# Patient Record
Sex: FEMALE | Race: WHITE | NOT HISPANIC OR LATINO | Employment: UNEMPLOYED | URBAN - NONMETROPOLITAN AREA
[De-identification: names, ages, dates, MRNs, and addresses within clinical notes are randomized per-mention and may not be internally consistent; named-entity substitution may affect disease eponyms.]

---

## 2017-05-15 ENCOUNTER — TRANSCRIBE ORDERS (OUTPATIENT)
Dept: ADMINISTRATIVE | Facility: HOSPITAL | Age: 72
End: 2017-05-15

## 2017-05-15 DIAGNOSIS — R10.9 ABDOMINAL PAIN, UNSPECIFIED LOCATION: Primary | ICD-10-CM

## 2017-05-17 ENCOUNTER — HOSPITAL ENCOUNTER (OUTPATIENT)
Dept: CT IMAGING | Facility: HOSPITAL | Age: 72
Discharge: HOME | End: 2017-05-17
Admitting: NURSE PRACTITIONER

## 2017-05-17 DIAGNOSIS — R10.9 ABDOMINAL PAIN, UNSPECIFIED LOCATION: ICD-10-CM

## 2017-05-17 LAB — CREAT BLDA-MCNC: 0.8 MG/DL (ref 0.6–1.3)

## 2017-05-17 PROCEDURE — 82565 ASSAY OF CREATININE: CPT

## 2017-05-17 PROCEDURE — 74177 CT ABD & PELVIS W/CONTRAST: CPT

## 2017-05-17 PROCEDURE — 0 IOPAMIDOL 61 % SOLUTION: Performed by: NURSE PRACTITIONER

## 2017-05-17 PROCEDURE — 74177 CT ABD & PELVIS W/CONTRAST: CPT | Performed by: RADIOLOGY

## 2017-05-17 RX ADMIN — IOPAMIDOL 100 ML: 612 INJECTION, SOLUTION INTRAVENOUS at 11:00

## 2017-05-24 ENCOUNTER — OFFICE VISIT (OUTPATIENT)
Dept: SURGERY | Facility: CLINIC | Age: 72
End: 2017-05-24

## 2017-05-24 VITALS
BODY MASS INDEX: 26.13 KG/M2 | HEIGHT: 62 IN | SYSTOLIC BLOOD PRESSURE: 130 MMHG | DIASTOLIC BLOOD PRESSURE: 71 MMHG | HEART RATE: 74 BPM | WEIGHT: 142 LBS

## 2017-05-24 DIAGNOSIS — K80.20 GALLSTONES: Primary | ICD-10-CM

## 2017-05-24 PROCEDURE — 99204 OFFICE O/P NEW MOD 45 MIN: CPT | Performed by: SURGERY

## 2017-05-24 RX ORDER — FENOFIBRATE 160 MG/1
160 TABLET ORAL DAILY
COMMUNITY
Start: 2017-04-18

## 2017-05-24 RX ORDER — PRAVASTATIN SODIUM 40 MG
40 TABLET ORAL DAILY
COMMUNITY
Start: 2017-04-18

## 2017-05-24 RX ORDER — SODIUM CHLORIDE 0.9 % (FLUSH) 0.9 %
1-10 SYRINGE (ML) INJECTION AS NEEDED
Status: CANCELLED | OUTPATIENT
Start: 2017-05-24

## 2017-05-24 RX ORDER — METOPROLOL TARTRATE 50 MG/1
50 TABLET, FILM COATED ORAL 2 TIMES DAILY
Status: ON HOLD | COMMUNITY
Start: 2017-04-18 | End: 2020-08-20

## 2017-05-24 RX ORDER — PANTOPRAZOLE SODIUM 40 MG/1
40 TABLET, DELAYED RELEASE ORAL DAILY
COMMUNITY
Start: 2017-02-20

## 2017-05-24 RX ORDER — FLUCONAZOLE 200 MG/1
TABLET ORAL
COMMUNITY
Start: 2017-04-18 | End: 2017-09-14

## 2017-08-28 ENCOUNTER — TRANSCRIBE ORDERS (OUTPATIENT)
Dept: ADMINISTRATIVE | Facility: HOSPITAL | Age: 72
End: 2017-08-28

## 2017-08-28 DIAGNOSIS — R10.9 ABDOMINAL PAIN, ACUTE: Primary | ICD-10-CM

## 2017-08-28 DIAGNOSIS — K80.20 GALLSTONES: ICD-10-CM

## 2017-08-31 ENCOUNTER — HOSPITAL ENCOUNTER (OUTPATIENT)
Dept: ULTRASOUND IMAGING | Facility: HOSPITAL | Age: 72
Discharge: HOME | End: 2017-08-31
Attending: INTERNAL MEDICINE

## 2017-08-31 ENCOUNTER — HOSPITAL ENCOUNTER (OUTPATIENT)
Dept: NUCLEAR MEDICINE | Facility: HOSPITAL | Age: 72
Discharge: HOME | End: 2017-08-31
Attending: INTERNAL MEDICINE

## 2017-08-31 DIAGNOSIS — K80.20 GALLSTONES: ICD-10-CM

## 2017-08-31 DIAGNOSIS — R10.9 ABDOMINAL PAIN, ACUTE: ICD-10-CM

## 2017-08-31 PROCEDURE — 0 TECHNETIUM TC 99M MEBROFENIN KIT: Performed by: INTERNAL MEDICINE

## 2017-08-31 PROCEDURE — 78226 HEPATOBILIARY SYSTEM IMAGING: CPT | Performed by: RADIOLOGY

## 2017-08-31 PROCEDURE — 93975 VASCULAR STUDY: CPT

## 2017-08-31 PROCEDURE — A9537 TC99M MEBROFENIN: HCPCS | Performed by: INTERNAL MEDICINE

## 2017-08-31 PROCEDURE — 78226 HEPATOBILIARY SYSTEM IMAGING: CPT

## 2017-08-31 PROCEDURE — 93975 VASCULAR STUDY: CPT | Performed by: RADIOLOGY

## 2017-08-31 RX ORDER — KIT FOR THE PREPARATION OF TECHNETIUM TC 99M MEBROFENIN 45 MG/10ML
1 INJECTION, POWDER, LYOPHILIZED, FOR SOLUTION INTRAVENOUS
Status: COMPLETED | OUTPATIENT
Start: 2017-08-31 | End: 2017-08-31

## 2017-08-31 RX ADMIN — MEBROFENIN 1 DOSE: 45 INJECTION, POWDER, LYOPHILIZED, FOR SOLUTION INTRAVENOUS at 08:49

## 2017-09-13 ENCOUNTER — OFFICE VISIT (OUTPATIENT)
Dept: SURGERY | Facility: CLINIC | Age: 72
End: 2017-09-13

## 2017-09-13 VITALS
DIASTOLIC BLOOD PRESSURE: 79 MMHG | SYSTOLIC BLOOD PRESSURE: 117 MMHG | HEART RATE: 86 BPM | BODY MASS INDEX: 26.13 KG/M2 | WEIGHT: 142 LBS | HEIGHT: 62 IN

## 2017-09-13 DIAGNOSIS — K82.8 BILIARY DYSKINESIA: Primary | ICD-10-CM

## 2017-09-13 PROCEDURE — 99214 OFFICE O/P EST MOD 30 MIN: CPT | Performed by: SURGERY

## 2017-09-13 RX ORDER — SODIUM CHLORIDE 0.9 % (FLUSH) 0.9 %
1-10 SYRINGE (ML) INJECTION AS NEEDED
Status: CANCELLED | OUTPATIENT
Start: 2017-09-13

## 2017-09-13 NOTE — PROGRESS NOTES
Subjective   Nancy Healy is a 72 y.o. female is being seen for consultation today at the request of Eliel Barcenas MD    Abdominal Pain   This is a new problem. The current episode started more than 1 month ago. The onset quality is sudden. The problem occurs intermittently. The problem has been gradually worsening. The pain is located in the RUQ. The pain is at a severity of 6/10. The pain is moderate. The quality of the pain is dull and a sensation of fullness. The abdominal pain radiates to the back. Associated symptoms include a fever. Pertinent negatives include no anorexia, constipation, diarrhea, dysuria, headaches, nausea or vomiting. The pain is aggravated by eating. The pain is relieved by nothing. She has tried acetaminophen for the symptoms. The treatment provided mild relief. Prior diagnostic workup includes lower endoscopy (HIDA EF 30%). Her past medical history is significant for abdominal surgery (hysterectomy).       Past Medical History:   Diagnosis Date   • Basal cell carcinoma        Family History   Problem Relation Age of Onset   • Heart disease Mother    • Diabetes Mother    • Heart disease Father    • Cancer Sister    • Heart disease Sister    • Cancer Brother    • Cancer Brother    • Heart disease Brother    • Cancer Brother    • Heart disease Brother    • Diabetes Brother    • Cancer Sister    • Heart disease Sister    • Diabetes Sister    • Heart disease Sister    • Cancer Sister        Social History     Social History   • Marital status:      Spouse name: N/A   • Number of children: N/A   • Years of education: N/A     Occupational History   • Not on file.     Social History Main Topics   • Smoking status: Never Smoker   • Smokeless tobacco: Not on file   • Alcohol use No   • Drug use: No   • Sexual activity: Defer     Other Topics Concern   • Not on file     Social History Narrative       Past Surgical History:   Procedure Laterality Date   • CARDIAC CATHETERIZATION     •  "HYSTERECTOMY     • SKIN LESION EXCISION         The following portions of the patient's history were reviewed and updated as appropriate: allergies, current medications, past family history, past medical history, past social history, past surgical history and problem list.    Review of Systems   Constitutional: Positive for fever. Negative for activity change, appetite change and chills.   HENT: Negative for sore throat and trouble swallowing.    Eyes: Negative for visual disturbance.   Respiratory: Negative for cough and shortness of breath.    Cardiovascular: Negative for chest pain and palpitations.   Gastrointestinal: Positive for abdominal pain. Negative for abdominal distention, anorexia, blood in stool, constipation, diarrhea, nausea and vomiting.   Endocrine: Negative for cold intolerance and heat intolerance.   Genitourinary: Negative for dysuria.   Musculoskeletal: Negative for joint swelling.   Skin: Negative for color change, rash and wound.   Allergic/Immunologic: Negative for immunocompromised state.   Neurological: Negative for dizziness, seizures, weakness and headaches.   Hematological: Negative for adenopathy. Does not bruise/bleed easily.   Psychiatric/Behavioral: Negative for agitation and confusion.         /79  Pulse 86  Ht 62\" (157.5 cm)  Wt 142 lb (64.4 kg)  BMI 25.97 kg/m2  Objective   Physical Exam   Constitutional: She is oriented to person, place, and time. She appears well-developed.   HENT:   Head: Normocephalic and atraumatic.   Mouth/Throat: Mucous membranes are normal.   Eyes: Conjunctivae are normal. Pupils are equal, round, and reactive to light.   Neck: Neck supple. No JVD present. No tracheal deviation present. No thyromegaly present.   Cardiovascular: Normal rate and regular rhythm.  Exam reveals no gallop and no friction rub.    No murmur heard.  Pulmonary/Chest: Effort normal and breath sounds normal.   Abdominal: Soft. She exhibits no distension. There is no " splenomegaly or hepatomegaly. There is no tenderness. No hernia.   Musculoskeletal: Normal range of motion. She exhibits no deformity.   Neurological: She is alert and oriented to person, place, and time.   Skin: Skin is warm and dry.   Psychiatric: She has a normal mood and affect.         Nancy was seen today for gallbladder dysfunction.    Diagnoses and all orders for this visit:    Biliary dyskinesia  -     Case Request; Standing  -     CBC and Differential; Future  -     Comprehensive metabolic panel; Future  -     sodium chloride 0.9 % flush 1-10 mL; Infuse 1-10 mL into a venous catheter As Needed for Line Care.  -     metroNIDAZOLE (FLAGYL) IVPB 500 mg; Infuse 100 mL into a venous catheter 1 (One) Time.  -     Case Request    Other orders  -     Provide instructions to patient on NPO status  -     Clorhexidine skin prep  -     Follow Anesthesia Guidelines / Standing Orders; Standing  -     Verify NPO Status; Standing  -     Obtain informed consent; Standing  -     SCD (sequential compression device)- to be placed on patient in Pre-op; Standing  -     Instructions on coughing, deep breathing, and incentive spirometry.; Standing  -     Insert Peripheral IV; Standing  -     Saline Lock & Maintain IV Access; Standing        Assessment     71 yo F with biliary dyskinesia.  She understands the risks and benefits of surgery and will be scheduled for laparoscopic cholecystectomy.

## 2017-09-14 ENCOUNTER — APPOINTMENT (OUTPATIENT)
Dept: PREADMISSION TESTING | Facility: HOSPITAL | Age: 72
End: 2017-09-14

## 2017-09-14 DIAGNOSIS — K82.8 BILIARY DYSKINESIA: ICD-10-CM

## 2017-09-14 LAB
ALBUMIN SERPL-MCNC: 4.4 G/DL (ref 3.4–4.8)
ALBUMIN/GLOB SERPL: 1.6 G/DL (ref 1.5–2.5)
ALP SERPL-CCNC: 79 U/L (ref 35–104)
ALT SERPL W P-5'-P-CCNC: 19 U/L (ref 10–36)
ANION GAP SERPL CALCULATED.3IONS-SCNC: 6.5 MMOL/L (ref 3.6–11.2)
AST SERPL-CCNC: 23 U/L (ref 10–30)
BASOPHILS # BLD AUTO: 0.02 10*3/MM3 (ref 0–0.3)
BASOPHILS NFR BLD AUTO: 0.2 % (ref 0–2)
BILIRUB SERPL-MCNC: 0.3 MG/DL (ref 0.2–1.8)
BUN BLD-MCNC: 16 MG/DL (ref 7–21)
BUN/CREAT SERPL: 19.5 (ref 7–25)
CALCIUM SPEC-SCNC: 9.6 MG/DL (ref 7.7–10)
CHLORIDE SERPL-SCNC: 108 MMOL/L (ref 99–112)
CO2 SERPL-SCNC: 28.5 MMOL/L (ref 24.3–31.9)
CREAT BLD-MCNC: 0.82 MG/DL (ref 0.43–1.29)
DEPRECATED RDW RBC AUTO: 45.2 FL (ref 37–54)
EOSINOPHIL # BLD AUTO: 0.09 10*3/MM3 (ref 0–0.7)
EOSINOPHIL NFR BLD AUTO: 1 % (ref 0–7)
ERYTHROCYTE [DISTWIDTH] IN BLOOD BY AUTOMATED COUNT: 13.4 % (ref 11.5–14.5)
GFR SERPL CREATININE-BSD FRML MDRD: 69 ML/MIN/1.73
GLOBULIN UR ELPH-MCNC: 2.7 GM/DL
GLUCOSE BLD-MCNC: 99 MG/DL (ref 70–110)
HCT VFR BLD AUTO: 46.2 % (ref 37–47)
HGB BLD-MCNC: 15.1 G/DL (ref 12–16)
IMM GRANULOCYTES # BLD: 0.03 10*3/MM3 (ref 0–0.03)
IMM GRANULOCYTES NFR BLD: 0.3 % (ref 0–0.5)
LYMPHOCYTES # BLD AUTO: 2.63 10*3/MM3 (ref 1–3)
LYMPHOCYTES NFR BLD AUTO: 29.7 % (ref 16–46)
MCH RBC QN AUTO: 30.2 PG (ref 27–33)
MCHC RBC AUTO-ENTMCNC: 32.7 G/DL (ref 33–37)
MCV RBC AUTO: 92.4 FL (ref 80–94)
MONOCYTES # BLD AUTO: 0.7 10*3/MM3 (ref 0.1–0.9)
MONOCYTES NFR BLD AUTO: 7.9 % (ref 0–12)
NEUTROPHILS # BLD AUTO: 5.38 10*3/MM3 (ref 1.4–6.5)
NEUTROPHILS NFR BLD AUTO: 60.9 % (ref 40–75)
OSMOLALITY SERPL CALC.SUM OF ELEC: 286.2 MOSM/KG (ref 273–305)
PLATELET # BLD AUTO: 272 10*3/MM3 (ref 130–400)
PMV BLD AUTO: 10.9 FL (ref 6–10)
POTASSIUM BLD-SCNC: 3.8 MMOL/L (ref 3.5–5.3)
PROT SERPL-MCNC: 7.1 G/DL (ref 6–8)
RBC # BLD AUTO: 5 10*6/MM3 (ref 4.2–5.4)
SODIUM BLD-SCNC: 143 MMOL/L (ref 135–153)
WBC NRBC COR # BLD: 8.85 10*3/MM3 (ref 4.5–12.5)

## 2017-09-14 PROCEDURE — 85025 COMPLETE CBC W/AUTO DIFF WBC: CPT | Performed by: SURGERY

## 2017-09-14 PROCEDURE — 80053 COMPREHEN METABOLIC PANEL: CPT | Performed by: SURGERY

## 2017-09-14 PROCEDURE — 36415 COLL VENOUS BLD VENIPUNCTURE: CPT

## 2017-09-14 RX ORDER — ALENDRONATE SODIUM 70 MG/1
70 TABLET ORAL
COMMUNITY

## 2017-09-14 NOTE — DISCHARGE INSTRUCTIONS
TAKE the following medications the morning of surgery:  All heart or blood pressure medications    Please discontinue all blood thinners and anticoagulants (except aspirin) prior to surgery as per your surgeon and cardiologist instructions.  Aspirin may be continued up to the day prior to surgery.    HOLD all diabetic medications the morning of surgery as order by physician.    Please follow instructions on use of prep cloths provided by nurse. Return instruction sheet with stickers attached to pre-op nurse on day of surgery.    Arrival time for surgery on 9/15/2017 is 0830 am.    General Instructions:  • Do NOT eat or drink after midnight 9/14/2017 which includes water, mints, or gum.  • You may brush your teeth. Dental appliances that are removable must be taken out day of surgery.  • Do NOT smoke, chew tobacco, or drink alcohol within 24 hours prior to surgery.  • Bring medications in original bottles, any inhalers and if applicable your C-PAP/BI-PAP machine  • Bring any papers given to you in the doctor’s office  • Wear clean, comfortable clothes and socks  • Do NOT wear contact lenses or make-up or dark nail polish.  Bring a case for your glasses if applicable.  • Bring crutches or walker if applicable  • Leave all other valuables and jewelry at home  • If you were given a blood bank armband, continue to wear it until discharged.    Preventing a Surgical Site Infection:  • Shower on the morning of surgery using a fresh bar of anti-bacterial soap (such as Dial) and clean washcloth.  Dry with a clean towel and dress in clean clothing.  • For 2 to 3 days before surgery, avoid shaving with a razor near where you will have surgery because the razor can irritate skin and make it easier to develop an infection.  Ask your surgeon if you will be receiving antibiotics prior to surgery.  • Make sure you, your family, and all healthcare providers clean their hands with soap and water or an alcohol-based hand   before caring for you or your wound.  • If at all possible, quit smoking as many days before surgery as you can.    Day of Surgery:  Upon arrival, a pre-op nurse and anesthesiologist will review your health history, obtain vital signs, and answer questions you may have.  The only belongings needed at this time will be your home medications and if applicable you C-PAP/BI-PAP machine.  If you are staying overnight, your family can leave the rest of your belongings in the car and bring them to your room later.  A pre-op nurse will start an IV and you may receive medication in preparation for surgery.  Due to patient privacy and limited space, only one member of your family will be able to accompany you in the pre-op area.  While you are in surgery your family should notify the waiting room  if they leave the waiting room area and provide a contact number.  Please be aware that surgery does come with discomfort.  We want to make every effort to control your discomfort so please discuss any uncontrolled symptoms with your nurse.  Your doctor will most likely have prescribed pain medications.  If you are going home after surgery you will receive individualized written care instructions before being discharged.  A responsible adult must drive you to and from the hospital on the day of surgery and stay with you for 24 hours.  If you are staying overnight following surgery, you will be transported to your hospital room following the recovery period.

## 2017-09-15 ENCOUNTER — HOSPITAL ENCOUNTER (OUTPATIENT)
Facility: HOSPITAL | Age: 72
Setting detail: HOSPITAL OUTPATIENT SURGERY
Discharge: HOME | End: 2017-09-15
Attending: SURGERY | Admitting: SURGERY

## 2017-09-15 ENCOUNTER — ANESTHESIA EVENT (OUTPATIENT)
Dept: PERIOP | Facility: HOSPITAL | Age: 72
End: 2017-09-15

## 2017-09-15 ENCOUNTER — ANESTHESIA (OUTPATIENT)
Dept: PERIOP | Facility: HOSPITAL | Age: 72
End: 2017-09-15

## 2017-09-15 VITALS
TEMPERATURE: 97.6 F | OXYGEN SATURATION: 96 % | HEIGHT: 62 IN | RESPIRATION RATE: 18 BRPM | SYSTOLIC BLOOD PRESSURE: 150 MMHG | WEIGHT: 143 LBS | BODY MASS INDEX: 26.31 KG/M2 | HEART RATE: 51 BPM | DIASTOLIC BLOOD PRESSURE: 70 MMHG

## 2017-09-15 DIAGNOSIS — K82.8 BILIARY DYSKINESIA: ICD-10-CM

## 2017-09-15 PROCEDURE — 47562 LAPAROSCOPIC CHOLECYSTECTOMY: CPT | Performed by: SURGERY

## 2017-09-15 PROCEDURE — 25010000002 FENTANYL CITRATE (PF) 100 MCG/2ML SOLUTION: Performed by: NURSE ANESTHETIST, CERTIFIED REGISTERED

## 2017-09-15 PROCEDURE — 25010000002 PROPOFOL 10 MG/ML EMULSION: Performed by: NURSE ANESTHETIST, CERTIFIED REGISTERED

## 2017-09-15 PROCEDURE — 25010000002 ONDANSETRON PER 1 MG: Performed by: NURSE ANESTHETIST, CERTIFIED REGISTERED

## 2017-09-15 PROCEDURE — 25010000002 NEOSTIGMINE 10 MG/10ML SOLUTION: Performed by: NURSE ANESTHETIST, CERTIFIED REGISTERED

## 2017-09-15 PROCEDURE — 25010000002 DEXAMETHASONE PER 1 MG: Performed by: NURSE ANESTHETIST, CERTIFIED REGISTERED

## 2017-09-15 PROCEDURE — 88304 TISSUE EXAM BY PATHOLOGIST: CPT | Performed by: SURGERY

## 2017-09-15 PROCEDURE — 25010000002 MIDAZOLAM PER 1 MG: Performed by: NURSE ANESTHETIST, CERTIFIED REGISTERED

## 2017-09-15 PROCEDURE — 94799 UNLISTED PULMONARY SVC/PX: CPT

## 2017-09-15 RX ORDER — ROCURONIUM BROMIDE 10 MG/ML
INJECTION, SOLUTION INTRAVENOUS AS NEEDED
Status: DISCONTINUED | OUTPATIENT
Start: 2017-09-15 | End: 2017-09-15 | Stop reason: SURG

## 2017-09-15 RX ORDER — IPRATROPIUM BROMIDE AND ALBUTEROL SULFATE 2.5; .5 MG/3ML; MG/3ML
3 SOLUTION RESPIRATORY (INHALATION) ONCE AS NEEDED
Status: DISCONTINUED | OUTPATIENT
Start: 2017-09-15 | End: 2017-09-15 | Stop reason: HOSPADM

## 2017-09-15 RX ORDER — FENTANYL CITRATE 50 UG/ML
INJECTION, SOLUTION INTRAMUSCULAR; INTRAVENOUS AS NEEDED
Status: DISCONTINUED | OUTPATIENT
Start: 2017-09-15 | End: 2017-09-15 | Stop reason: SURG

## 2017-09-15 RX ORDER — SODIUM CHLORIDE 0.9 % (FLUSH) 0.9 %
1-10 SYRINGE (ML) INJECTION AS NEEDED
Status: DISCONTINUED | OUTPATIENT
Start: 2017-09-15 | End: 2017-09-15 | Stop reason: HOSPADM

## 2017-09-15 RX ORDER — LIDOCAINE HYDROCHLORIDE 20 MG/ML
INJECTION, SOLUTION INFILTRATION; PERINEURAL AS NEEDED
Status: DISCONTINUED | OUTPATIENT
Start: 2017-09-15 | End: 2017-09-15 | Stop reason: SURG

## 2017-09-15 RX ORDER — LABETALOL HYDROCHLORIDE 5 MG/ML
INJECTION, SOLUTION INTRAVENOUS AS NEEDED
Status: DISCONTINUED | OUTPATIENT
Start: 2017-09-15 | End: 2017-09-15 | Stop reason: SURG

## 2017-09-15 RX ORDER — BUPIVACAINE HYDROCHLORIDE AND EPINEPHRINE 2.5; 5 MG/ML; UG/ML
INJECTION, SOLUTION EPIDURAL; INFILTRATION; INTRACAUDAL; PERINEURAL AS NEEDED
Status: DISCONTINUED | OUTPATIENT
Start: 2017-09-15 | End: 2017-09-15 | Stop reason: HOSPADM

## 2017-09-15 RX ORDER — SODIUM CHLORIDE, SODIUM LACTATE, POTASSIUM CHLORIDE, CALCIUM CHLORIDE 600; 310; 30; 20 MG/100ML; MG/100ML; MG/100ML; MG/100ML
125 INJECTION, SOLUTION INTRAVENOUS CONTINUOUS
Status: DISCONTINUED | OUTPATIENT
Start: 2017-09-15 | End: 2017-09-15 | Stop reason: HOSPADM

## 2017-09-15 RX ORDER — ESMOLOL HYDROCHLORIDE 10 MG/ML
INJECTION INTRAVENOUS AS NEEDED
Status: DISCONTINUED | OUTPATIENT
Start: 2017-09-15 | End: 2017-09-15 | Stop reason: SURG

## 2017-09-15 RX ORDER — HYDROCODONE BITARTRATE AND ACETAMINOPHEN 5; 325 MG/1; MG/1
1-2 TABLET ORAL EVERY 4 HOURS PRN
Qty: 20 TABLET | Refills: 0 | Status: SHIPPED | OUTPATIENT
Start: 2017-09-15 | End: 2020-08-17

## 2017-09-15 RX ORDER — SODIUM CHLORIDE 9 MG/ML
INJECTION, SOLUTION INTRAVENOUS AS NEEDED
Status: DISCONTINUED | OUTPATIENT
Start: 2017-09-15 | End: 2017-09-15 | Stop reason: HOSPADM

## 2017-09-15 RX ORDER — GLYCOPYRROLATE 0.2 MG/ML
INJECTION INTRAMUSCULAR; INTRAVENOUS AS NEEDED
Status: DISCONTINUED | OUTPATIENT
Start: 2017-09-15 | End: 2017-09-15 | Stop reason: SURG

## 2017-09-15 RX ORDER — ONDANSETRON 2 MG/ML
INJECTION INTRAMUSCULAR; INTRAVENOUS AS NEEDED
Status: DISCONTINUED | OUTPATIENT
Start: 2017-09-15 | End: 2017-09-15 | Stop reason: SURG

## 2017-09-15 RX ORDER — FENTANYL CITRATE 50 UG/ML
50 INJECTION, SOLUTION INTRAMUSCULAR; INTRAVENOUS
Status: DISCONTINUED | OUTPATIENT
Start: 2017-09-15 | End: 2017-09-15 | Stop reason: HOSPADM

## 2017-09-15 RX ORDER — MEPERIDINE HYDROCHLORIDE 25 MG/ML
12.5 INJECTION INTRAMUSCULAR; INTRAVENOUS; SUBCUTANEOUS
Status: DISCONTINUED | OUTPATIENT
Start: 2017-09-15 | End: 2017-09-15 | Stop reason: HOSPADM

## 2017-09-15 RX ORDER — PROPOFOL 10 MG/ML
VIAL (ML) INTRAVENOUS AS NEEDED
Status: DISCONTINUED | OUTPATIENT
Start: 2017-09-15 | End: 2017-09-15 | Stop reason: SURG

## 2017-09-15 RX ORDER — DEXAMETHASONE SODIUM PHOSPHATE 4 MG/ML
INJECTION, SOLUTION INTRA-ARTICULAR; INTRALESIONAL; INTRAMUSCULAR; INTRAVENOUS; SOFT TISSUE AS NEEDED
Status: DISCONTINUED | OUTPATIENT
Start: 2017-09-15 | End: 2017-09-15 | Stop reason: SURG

## 2017-09-15 RX ORDER — NEOSTIGMINE METHYLSULFATE 1 MG/ML
INJECTION, SOLUTION INTRAVENOUS AS NEEDED
Status: DISCONTINUED | OUTPATIENT
Start: 2017-09-15 | End: 2017-09-15 | Stop reason: SURG

## 2017-09-15 RX ORDER — OXYCODONE HYDROCHLORIDE AND ACETAMINOPHEN 5; 325 MG/1; MG/1
1 TABLET ORAL ONCE AS NEEDED
Status: DISCONTINUED | OUTPATIENT
Start: 2017-09-15 | End: 2017-09-15 | Stop reason: HOSPADM

## 2017-09-15 RX ORDER — MAGNESIUM HYDROXIDE 1200 MG/15ML
LIQUID ORAL AS NEEDED
Status: DISCONTINUED | OUTPATIENT
Start: 2017-09-15 | End: 2017-09-15 | Stop reason: HOSPADM

## 2017-09-15 RX ORDER — ONDANSETRON 2 MG/ML
4 INJECTION INTRAMUSCULAR; INTRAVENOUS ONCE AS NEEDED
Status: DISCONTINUED | OUTPATIENT
Start: 2017-09-15 | End: 2017-09-15 | Stop reason: HOSPADM

## 2017-09-15 RX ORDER — MIDAZOLAM HYDROCHLORIDE 1 MG/ML
INJECTION INTRAMUSCULAR; INTRAVENOUS AS NEEDED
Status: DISCONTINUED | OUTPATIENT
Start: 2017-09-15 | End: 2017-09-15 | Stop reason: SURG

## 2017-09-15 RX ADMIN — ESMOLOL HYDROCHLORIDE 20 MG: 10 INJECTION, SOLUTION INTRAVENOUS at 12:10

## 2017-09-15 RX ADMIN — FENTANYL CITRATE 25 MCG: 50 INJECTION INTRAMUSCULAR; INTRAVENOUS at 12:15

## 2017-09-15 RX ADMIN — NEOSTIGMINE METHYLSULFATE 3 MG: 1 INJECTION, SOLUTION INTRAVENOUS at 12:12

## 2017-09-15 RX ADMIN — METRONIDAZOLE 500 MG: 500 INJECTION, SOLUTION INTRAVENOUS at 11:44

## 2017-09-15 RX ADMIN — ROCURONIUM BROMIDE 30 MG: 10 INJECTION INTRAVENOUS at 11:50

## 2017-09-15 RX ADMIN — ONDANSETRON 4 MG: 2 INJECTION, SOLUTION INTRAMUSCULAR; INTRAVENOUS at 11:50

## 2017-09-15 RX ADMIN — FENTANYL CITRATE 50 MCG: 50 INJECTION INTRAMUSCULAR; INTRAVENOUS at 12:07

## 2017-09-15 RX ADMIN — MIDAZOLAM HYDROCHLORIDE 2 MG: 1 INJECTION, SOLUTION INTRAMUSCULAR; INTRAVENOUS at 11:44

## 2017-09-15 RX ADMIN — PROPOFOL 150 MG: 10 INJECTION, EMULSION INTRAVENOUS at 11:50

## 2017-09-15 RX ADMIN — FENTANYL CITRATE 100 MCG: 50 INJECTION INTRAMUSCULAR; INTRAVENOUS at 11:44

## 2017-09-15 RX ADMIN — EPHEDRINE SULFATE 10 MG: 50 INJECTION INTRAMUSCULAR; INTRAVENOUS; SUBCUTANEOUS at 11:55

## 2017-09-15 RX ADMIN — LABETALOL HYDROCHLORIDE 10 MG: 5 INJECTION, SOLUTION INTRAVENOUS at 12:22

## 2017-09-15 RX ADMIN — FENTANYL CITRATE 50 MCG: 50 INJECTION INTRAMUSCULAR; INTRAVENOUS at 12:29

## 2017-09-15 RX ADMIN — LIDOCAINE HYDROCHLORIDE 60 MG: 20 INJECTION, SOLUTION INFILTRATION; PERINEURAL at 11:50

## 2017-09-15 RX ADMIN — SODIUM CHLORIDE, POTASSIUM CHLORIDE, SODIUM LACTATE AND CALCIUM CHLORIDE 125 ML/HR: 600; 310; 30; 20 INJECTION, SOLUTION INTRAVENOUS at 11:38

## 2017-09-15 RX ADMIN — MEPERIDINE HYDROCHLORIDE 12.5 MG: 25 INJECTION, SOLUTION INTRAMUSCULAR; INTRAVENOUS; SUBCUTANEOUS at 12:58

## 2017-09-15 RX ADMIN — GLYCOPYRROLATE 0.4 MG: 0.2 INJECTION, SOLUTION INTRAMUSCULAR; INTRAVENOUS at 12:12

## 2017-09-15 RX ADMIN — FENTANYL CITRATE 25 MCG: 50 INJECTION INTRAMUSCULAR; INTRAVENOUS at 12:00

## 2017-09-15 RX ADMIN — DEXAMETHASONE SODIUM PHOSPHATE 4 MG: 4 INJECTION, SOLUTION INTRAMUSCULAR; INTRAVENOUS at 11:50

## 2017-09-15 NOTE — PLAN OF CARE
Problem: Patient Care Overview (Adult)  Goal: Discharge Needs Assessment  Outcome: Ongoing (interventions implemented as appropriate)    09/15/17 1127   Discharge Needs Assessment   Concerns To Be Addressed no discharge needs identified   Readmission Within The Last 30 Days no previous admission in last 30 days   Equipment Needed After Discharge none   Discharge Disposition home or self-care   Current Health   Anticipated Changes Related to Illness none   Self-Care   Equipment Currently Used at Home none   Living Environment   Transportation Available car

## 2017-09-15 NOTE — PLAN OF CARE
Problem: Patient Care Overview (Adult)  Goal: Plan of Care Review  Outcome: Ongoing (interventions implemented as appropriate)    09/15/17 1128   Coping/Psychosocial Response Interventions   Plan Of Care Reviewed With spouse;patient   Patient Care Overview   Progress no change

## 2017-09-15 NOTE — ANESTHESIA PREPROCEDURE EVALUATION
Anesthesia Evaluation     Patient summary reviewed and Nursing notes reviewed   history of anesthetic complications (h/o hypothermia post-op):  NPO Solid Status: > 8 hours  NPO Liquid Status: > 8 hours     Airway   Mallampati: II  TM distance: <3 FB  Neck ROM: full  possible difficult intubation, anterior and small opening  Dental - normal exam     Pulmonary - negative pulmonary ROS and normal exam   (-) asthma, not a smoker  Cardiovascular - normal exam  Exercise tolerance: good (4-7 METS)    NYHA Classification: II  Patient on routine beta blocker and Beta blocker given within 24 hours of surgery    (+) hypertension, hyperlipidemia  (-) past MI, dysrhythmias, angina, CHF      Neuro/Psych  (+) headaches,    (-) CVA  GI/Hepatic/Renal/Endo    (+)  GERD,   (-) liver disease, no renal disease, diabetes, hypothyroidism    Musculoskeletal     Abdominal  - normal exam    Bowel sounds: normal.   Substance History - negative use     OB/GYN negative ob/gyn ROS         Other   (+) arthritis   history of cancer                                    Anesthesia Plan    ASA 2     general     intravenous induction   Anesthetic plan and risks discussed with patient.  Use of blood products discussed with patient  Consented to blood products.

## 2017-09-15 NOTE — ANESTHESIA POSTPROCEDURE EVALUATION
Patient: Nancy Healy    Procedure Summary     Date Anesthesia Start Anesthesia Stop Room / Location    09/15/17 1144 1232 [facility] / [facility]       Procedure Diagnosis Surgeon Provider    CHOLECYSTECTOMY LAPAROSCOPIC (N/A Abdomen) Biliary dyskinesia  (Biliary dyskinesia [K82.8]) MD Ravinder Christian MD          Anesthesia Type: general  Last vitals  BP   153/67 (09/15/17 1302)    Temp   97.3 °F (36.3 °C) (09/15/17 1232)    Pulse   53 (09/15/17 1302)   Resp   14 (09/15/17 1302)    SpO2   99 % (09/15/17 1302)      Post Anesthesia Care and Evaluation    Patient location during evaluation: bedside  Patient participation: complete - patient participated  Level of consciousness: awake and alert  Pain score: 1  Pain management: adequate  Airway patency: patent  Anesthetic complications: No anesthetic complications  PONV Status: none  Cardiovascular status: acceptable  Respiratory status: acceptable  Hydration status: acceptable

## 2017-09-15 NOTE — ANESTHESIA PROCEDURE NOTES
Airway  Urgency: elective    Date/Time: 9/15/2017 11:51 AM  Airway not difficult    General Information and Staff    Patient location during procedure: OR  Anesthesiologist: GEN TAYLOR  CRNA: CHANCE GARVEY    Indications and Patient Condition  Indications for airway management: airway protection    Preoxygenated: yes  MILS maintained throughout  Mask difficulty assessment: 0 - not attempted    Final Airway Details  Final airway type: endotracheal airway      Successful airway: ETT  Cuffed: yes   Successful intubation technique: direct laryngoscopy  Facilitating devices/methods: intubating stylet  Blade: [place]  Blade size: #3  ETT size: 7.0 mm  Cormack-Lehane Classification: grade I - full view of glottis  Placement verified by: chest auscultation, capnometry and palpation of cuff   Measured from: lips  Number of attempts at approach: 1

## 2017-09-15 NOTE — OP NOTE
CHOLECYSTECTOMY LAPAROSCOPIC  Procedure Note    Nancy Healy  9/15/2017    Pre-op Diagnosis:   Biliary dyskinesia [K82.8]    Post-op Diagnosis:     Post-Op Diagnosis Codes:     * Biliary dyskinesia [K82.8]    Procedure(s):  CHOLECYSTECTOMY LAPAROSCOPIC    Surgeon(s):  Matthew Snyder MD    Anesthesia: General    Staff:   Circulator: Sobeida Medellin, RN  Scrub Person: Rehan Nguyen  Assistant: Dale Barlow    Findings: distended gallbladder, critical view of safety obtained    Operative Procedure:  The patient was taken operating suite and placed supine on operating table.  Bilateral sequential compression devices were applied and general endotracheal anesthesia administered.  The abdomen was prepped and draped in usual sterile fashion.  Preoperative antibiotics were confirmed.  Timeout procedure was performed.   A Veress needle was inserted palmers point the left upper quadrant and saline drop test confirmed entry into the peritoneal space.  Pneumoperitoneum was established.  An 11mm  Optiview trocar was inserted through an infraumbilical incision.  Veress needle site was without injury.  Three 5 mm working ports were placed along the right costal margin in the standard fashion.  The fundus of the gallbladder was grasped and retracted anteriorly and superiorly.  The infundibulum was retracted laterally inferiorly.  The peritoneum on the anterior and posterior surface of the gallbladder was opened bluntly.  The cystic duct and artery were dissected free circumferentially.  The gallbladder was elevated from the gallbladder fossa for portion.  The cystic plate was then visible from the anterior posterior views with only the cystic duct and artery entering the gallbladder.  With the critical view safety obtained 5 mm hemoclips were placed with 2 proximal and one distal on each structure.  The cystic duct and artery were transected between clips with laparoscopic scissors.  The gallbladder was  then removed from the gallbladder fossa intact.  The gallbladder was placed in an Endo Catch bag was removed through the infraumbilical fascial defect.  The gallbladder fossa was then made hemostatic and all ports removed under direct visualization.  Pneumoperitoneum was evacuated and all skin incisions were closed with Monocryl subcuticular suture after closure of the infra umbilical fascial defect with Vicryl suture.  Counts were correct.    Estimated Blood Loss: 5mL    Specimens:   gallbladder                 Drains: none    Grafts/Implants:  none    Complications: none      Matthew Snyder MD     Date: 9/15/2017  Time: 12:16 PM

## 2017-09-15 NOTE — H&P (VIEW-ONLY)
Subjective   Nancy Healy is a 72 y.o. female is being seen for consultation today at the request of Eliel Barcenas MD    Abdominal Pain   This is a new problem. The current episode started more than 1 month ago. The onset quality is sudden. The problem occurs intermittently. The problem has been gradually worsening. The pain is located in the RUQ. The pain is at a severity of 6/10. The pain is moderate. The quality of the pain is dull and a sensation of fullness. The abdominal pain radiates to the back. Associated symptoms include a fever. Pertinent negatives include no anorexia, constipation, diarrhea, dysuria, headaches, nausea or vomiting. The pain is aggravated by eating. The pain is relieved by nothing. She has tried acetaminophen for the symptoms. The treatment provided mild relief. Prior diagnostic workup includes lower endoscopy (HIDA EF 30%). Her past medical history is significant for abdominal surgery (hysterectomy).       Past Medical History:   Diagnosis Date   • Basal cell carcinoma        Family History   Problem Relation Age of Onset   • Heart disease Mother    • Diabetes Mother    • Heart disease Father    • Cancer Sister    • Heart disease Sister    • Cancer Brother    • Cancer Brother    • Heart disease Brother    • Cancer Brother    • Heart disease Brother    • Diabetes Brother    • Cancer Sister    • Heart disease Sister    • Diabetes Sister    • Heart disease Sister    • Cancer Sister        Social History     Social History   • Marital status:      Spouse name: N/A   • Number of children: N/A   • Years of education: N/A     Occupational History   • Not on file.     Social History Main Topics   • Smoking status: Never Smoker   • Smokeless tobacco: Not on file   • Alcohol use No   • Drug use: No   • Sexual activity: Defer     Other Topics Concern   • Not on file     Social History Narrative       Past Surgical History:   Procedure Laterality Date   • CARDIAC CATHETERIZATION     •  "HYSTERECTOMY     • SKIN LESION EXCISION         The following portions of the patient's history were reviewed and updated as appropriate: allergies, current medications, past family history, past medical history, past social history, past surgical history and problem list.    Review of Systems   Constitutional: Positive for fever. Negative for activity change, appetite change and chills.   HENT: Negative for sore throat and trouble swallowing.    Eyes: Negative for visual disturbance.   Respiratory: Negative for cough and shortness of breath.    Cardiovascular: Negative for chest pain and palpitations.   Gastrointestinal: Positive for abdominal pain. Negative for abdominal distention, anorexia, blood in stool, constipation, diarrhea, nausea and vomiting.   Endocrine: Negative for cold intolerance and heat intolerance.   Genitourinary: Negative for dysuria.   Musculoskeletal: Negative for joint swelling.   Skin: Negative for color change, rash and wound.   Allergic/Immunologic: Negative for immunocompromised state.   Neurological: Negative for dizziness, seizures, weakness and headaches.   Hematological: Negative for adenopathy. Does not bruise/bleed easily.   Psychiatric/Behavioral: Negative for agitation and confusion.         /79  Pulse 86  Ht 62\" (157.5 cm)  Wt 142 lb (64.4 kg)  BMI 25.97 kg/m2  Objective   Physical Exam   Constitutional: She is oriented to person, place, and time. She appears well-developed.   HENT:   Head: Normocephalic and atraumatic.   Mouth/Throat: Mucous membranes are normal.   Eyes: Conjunctivae are normal. Pupils are equal, round, and reactive to light.   Neck: Neck supple. No JVD present. No tracheal deviation present. No thyromegaly present.   Cardiovascular: Normal rate and regular rhythm.  Exam reveals no gallop and no friction rub.    No murmur heard.  Pulmonary/Chest: Effort normal and breath sounds normal.   Abdominal: Soft. She exhibits no distension. There is no " splenomegaly or hepatomegaly. There is no tenderness. No hernia.   Musculoskeletal: Normal range of motion. She exhibits no deformity.   Neurological: She is alert and oriented to person, place, and time.   Skin: Skin is warm and dry.   Psychiatric: She has a normal mood and affect.         Nancy was seen today for gallbladder dysfunction.    Diagnoses and all orders for this visit:    Biliary dyskinesia  -     Case Request; Standing  -     CBC and Differential; Future  -     Comprehensive metabolic panel; Future  -     sodium chloride 0.9 % flush 1-10 mL; Infuse 1-10 mL into a venous catheter As Needed for Line Care.  -     metroNIDAZOLE (FLAGYL) IVPB 500 mg; Infuse 100 mL into a venous catheter 1 (One) Time.  -     Case Request    Other orders  -     Provide instructions to patient on NPO status  -     Clorhexidine skin prep  -     Follow Anesthesia Guidelines / Standing Orders; Standing  -     Verify NPO Status; Standing  -     Obtain informed consent; Standing  -     SCD (sequential compression device)- to be placed on patient in Pre-op; Standing  -     Instructions on coughing, deep breathing, and incentive spirometry.; Standing  -     Insert Peripheral IV; Standing  -     Saline Lock & Maintain IV Access; Standing        Assessment     73 yo F with biliary dyskinesia.  She understands the risks and benefits of surgery and will be scheduled for laparoscopic cholecystectomy.

## 2017-09-15 NOTE — PLAN OF CARE
Problem: Patient Care Overview (Adult)  Goal: Adult Individualization and Mutuality  Outcome: Ongoing (interventions implemented as appropriate)    09/15/17 1128   Individualization   Patient Specific Preferences none

## 2017-09-19 LAB
LAB AP CASE REPORT: NORMAL
Lab: NORMAL
PATH REPORT.FINAL DX SPEC: NORMAL

## 2017-10-03 ENCOUNTER — OFFICE VISIT (OUTPATIENT)
Dept: SURGERY | Facility: CLINIC | Age: 72
End: 2017-10-03

## 2017-10-03 VITALS
DIASTOLIC BLOOD PRESSURE: 72 MMHG | SYSTOLIC BLOOD PRESSURE: 124 MMHG | BODY MASS INDEX: 26.31 KG/M2 | HEIGHT: 62 IN | HEART RATE: 81 BPM | WEIGHT: 143 LBS

## 2017-10-03 DIAGNOSIS — K82.8 BILIARY DYSKINESIA: Primary | ICD-10-CM

## 2017-10-03 PROCEDURE — 99024 POSTOP FOLLOW-UP VISIT: CPT | Performed by: SURGERY

## 2017-10-03 NOTE — PROGRESS NOTES
Subjective   Nancy Healy is a 72 y.o. female  is here today for follow-up.         Nancy Healy is a 72 y.o. female here for followup after cholecystectomy.  The patient is doing well and tolerating diet.  Their incisions are healing well.  No complaints reported.              Nancy was seen today for post-op lap ko.    Diagnoses and all orders for this visit:    Biliary dyskinesia        Assessment     72-year-old female status post left scopic cholecystectomy.  Pathology is consistent with benign diagnosis.  Patient is doing well without complaints and will follow-up as needed.

## 2018-03-21 ENCOUNTER — APPOINTMENT (OUTPATIENT)
Dept: ULTRASOUND IMAGING | Facility: HOSPITAL | Age: 73
End: 2018-03-21
Attending: EMERGENCY MEDICINE

## 2018-03-21 ENCOUNTER — HOSPITAL ENCOUNTER (EMERGENCY)
Facility: HOSPITAL | Age: 73
Discharge: HOME | End: 2018-03-21
Attending: EMERGENCY MEDICINE | Admitting: NURSE PRACTITIONER

## 2018-03-21 VITALS
HEIGHT: 62 IN | SYSTOLIC BLOOD PRESSURE: 151 MMHG | HEART RATE: 71 BPM | OXYGEN SATURATION: 99 % | DIASTOLIC BLOOD PRESSURE: 86 MMHG | RESPIRATION RATE: 17 BRPM | TEMPERATURE: 98.1 F | BODY MASS INDEX: 26.87 KG/M2 | WEIGHT: 146 LBS

## 2018-03-21 DIAGNOSIS — I65.22 STENOSIS OF LEFT CAROTID ARTERY: Primary | ICD-10-CM

## 2018-03-21 LAB
ALBUMIN SERPL-MCNC: 4.4 G/DL (ref 3.4–4.8)
ALBUMIN/GLOB SERPL: 1.4 G/DL (ref 1.5–2.5)
ALP SERPL-CCNC: 98 U/L (ref 35–104)
ALT SERPL W P-5'-P-CCNC: 22 U/L (ref 10–36)
ANION GAP SERPL CALCULATED.3IONS-SCNC: 5.7 MMOL/L (ref 3.6–11.2)
AST SERPL-CCNC: 24 U/L (ref 10–30)
BASOPHILS # BLD AUTO: 0.03 10*3/MM3 (ref 0–0.3)
BASOPHILS NFR BLD AUTO: 0.3 % (ref 0–2)
BILIRUB SERPL-MCNC: 0.4 MG/DL (ref 0.2–1.8)
BUN BLD-MCNC: 16 MG/DL (ref 7–21)
BUN/CREAT SERPL: 15.1 (ref 7–25)
CALCIUM SPEC-SCNC: 10 MG/DL (ref 7.7–10)
CHLORIDE SERPL-SCNC: 109 MMOL/L (ref 99–112)
CK MB SERPL-CCNC: 0.58 NG/ML (ref 0–5)
CK MB SERPL-RTO: 1.5 % (ref 0–3)
CK SERPL-CCNC: 40 U/L (ref 24–173)
CO2 SERPL-SCNC: 28.3 MMOL/L (ref 24.3–31.9)
CREAT BLD-MCNC: 1.06 MG/DL (ref 0.43–1.29)
DEPRECATED RDW RBC AUTO: 43.6 FL (ref 37–54)
EOSINOPHIL # BLD AUTO: 0.07 10*3/MM3 (ref 0–0.7)
EOSINOPHIL NFR BLD AUTO: 0.6 % (ref 0–7)
ERYTHROCYTE [DISTWIDTH] IN BLOOD BY AUTOMATED COUNT: 13.2 % (ref 11.5–14.5)
GFR SERPL CREATININE-BSD FRML MDRD: 51 ML/MIN/1.73
GLOBULIN UR ELPH-MCNC: 3.1 GM/DL
GLUCOSE BLD-MCNC: 92 MG/DL (ref 70–110)
HCT VFR BLD AUTO: 45.6 % (ref 37–47)
HGB BLD-MCNC: 15.2 G/DL (ref 12–16)
IMM GRANULOCYTES # BLD: 0.01 10*3/MM3 (ref 0–0.03)
IMM GRANULOCYTES NFR BLD: 0.1 % (ref 0–0.5)
LYMPHOCYTES # BLD AUTO: 3.31 10*3/MM3 (ref 1–3)
LYMPHOCYTES NFR BLD AUTO: 29.6 % (ref 16–46)
MCH RBC QN AUTO: 30.1 PG (ref 27–33)
MCHC RBC AUTO-ENTMCNC: 33.3 G/DL (ref 33–37)
MCV RBC AUTO: 90.3 FL (ref 80–94)
MONOCYTES # BLD AUTO: 1.04 10*3/MM3 (ref 0.1–0.9)
MONOCYTES NFR BLD AUTO: 9.3 % (ref 0–12)
NEUTROPHILS # BLD AUTO: 6.74 10*3/MM3 (ref 1.4–6.5)
NEUTROPHILS NFR BLD AUTO: 60.1 % (ref 40–75)
OSMOLALITY SERPL CALC.SUM OF ELEC: 285.8 MOSM/KG (ref 273–305)
PLATELET # BLD AUTO: 341 10*3/MM3 (ref 130–400)
PMV BLD AUTO: 9.6 FL (ref 6–10)
POTASSIUM BLD-SCNC: 3.8 MMOL/L (ref 3.5–5.3)
PROT SERPL-MCNC: 7.5 G/DL (ref 6–8)
RBC # BLD AUTO: 5.05 10*6/MM3 (ref 4.2–5.4)
SODIUM BLD-SCNC: 143 MMOL/L (ref 135–153)
TROPONIN I SERPL-MCNC: 0.01 NG/ML
WBC NRBC COR # BLD: 11.2 10*3/MM3 (ref 4.5–12.5)

## 2018-03-21 PROCEDURE — 93010 ELECTROCARDIOGRAM REPORT: CPT | Performed by: INTERNAL MEDICINE

## 2018-03-21 PROCEDURE — 93005 ELECTROCARDIOGRAM TRACING: CPT | Performed by: NURSE PRACTITIONER

## 2018-03-21 PROCEDURE — 82553 CREATINE MB FRACTION: CPT | Performed by: NURSE PRACTITIONER

## 2018-03-21 PROCEDURE — 93880 EXTRACRANIAL BILAT STUDY: CPT | Performed by: RADIOLOGY

## 2018-03-21 PROCEDURE — 93880 EXTRACRANIAL BILAT STUDY: CPT

## 2018-03-21 PROCEDURE — 85025 COMPLETE CBC W/AUTO DIFF WBC: CPT | Performed by: NURSE PRACTITIONER

## 2018-03-21 PROCEDURE — 99283 EMERGENCY DEPT VISIT LOW MDM: CPT

## 2018-03-21 PROCEDURE — 80053 COMPREHEN METABOLIC PANEL: CPT | Performed by: NURSE PRACTITIONER

## 2018-03-21 PROCEDURE — 82550 ASSAY OF CK (CPK): CPT | Performed by: NURSE PRACTITIONER

## 2018-03-21 PROCEDURE — 84484 ASSAY OF TROPONIN QUANT: CPT | Performed by: NURSE PRACTITIONER

## 2018-03-21 PROCEDURE — 36415 COLL VENOUS BLD VENIPUNCTURE: CPT

## 2018-03-22 ENCOUNTER — EPISODE CHANGES (OUTPATIENT)
Dept: CASE MANAGEMENT | Facility: OTHER | Age: 73
End: 2018-03-22

## 2018-03-22 NOTE — ED PROVIDER NOTES
Subjective   Patient reports that she was at a health fair.  She was evaluated for carotid stenosis and their testing was concerning for blockage in the left carotid artery.  She was advised to come to the ER for further testing and evaluation.  She denies any chest pain or palpitations.          History provided by:  Patient      Review of Systems   Constitutional: Negative.  Negative for fever.   HENT: Negative.    Respiratory: Negative.    Cardiovascular: Negative.  Negative for chest pain.   Gastrointestinal: Negative.  Negative for abdominal pain.   Endocrine: Negative.    Genitourinary: Negative.  Negative for dysuria.   Skin: Negative.    Neurological: Negative.    Psychiatric/Behavioral: Negative.    All other systems reviewed and are negative.      Past Medical History:   Diagnosis Date   • Arthritis    • Basal cell carcinoma    • Gallstones    • Hypertension    • Spinal headache        Allergies   Allergen Reactions   • Adhesive Tape Other (See Comments)     Skin blisters   • Penicillins Other (See Comments)     Severe yeast infection       Past Surgical History:   Procedure Laterality Date   • ABDOMINAL SURGERY     • CARDIAC CATHETERIZATION     • HYSTERECTOMY     • [place] LAP,CHOLECYSTECTOMY N/A 9/15/2017    Procedure: CHOLECYSTECTOMY LAPAROSCOPIC;  Surgeon: Matthew Snyder MD;  Location: [facility];  Service: General   • SKIN BIOPSY     • SKIN LESION EXCISION     • VASCULAR SURGERY         Family History   Problem Relation Age of Onset   • Heart disease Mother    • Diabetes Mother    • Heart disease Father    • Cancer Sister    • Heart disease Sister    • Cancer Brother    • Cancer Brother    • Heart disease Brother    • Cancer Brother    • Heart disease Brother    • Diabetes Brother    • Cancer Sister    • Heart disease Sister    • Diabetes Sister    • Heart disease Sister    • Cancer Sister        Social History     Social History   • Marital status:      Social History Main Topics   • Smoking  status: Never Smoker   • Smokeless tobacco: Never Used   • Alcohol use No   • Drug use: No   • Sexual activity: Defer     Other Topics Concern   • Not on file           Objective   Physical Exam   Constitutional: She is oriented to person, place, and time. She appears well-developed and well-nourished. No distress.   HENT:   Head: Normocephalic and atraumatic.   Right Ear: External ear normal.   Left Ear: External ear normal.   Nose: Nose normal.   Eyes: Conjunctivae and EOM are normal. Pupils are equal, round, and reactive to light.   Neck: Normal range of motion. Neck supple. No JVD present. No tracheal deviation present.   Cardiovascular: Normal rate, regular rhythm and normal heart sounds.    No murmur heard.  Pulmonary/Chest: Effort normal and breath sounds normal. No respiratory distress. She has no wheezes.   Abdominal: Soft. Bowel sounds are normal. There is no tenderness.   Musculoskeletal: Normal range of motion. She exhibits no edema or deformity.   Neurological: She is alert and oriented to person, place, and time. No cranial nerve deficit.   Skin: Skin is warm and dry. No rash noted. She is not diaphoretic. No erythema. No pallor.   Psychiatric: She has a normal mood and affect. Her behavior is normal. Thought content normal.   Nursing note and vitals reviewed.      Procedures         ED Course  ED Course                  MDM  Number of Diagnoses or Management Options  Stenosis of left carotid artery: new and requires workup     Amount and/or Complexity of Data Reviewed  Clinical lab tests: reviewed  Tests in the radiology section of CPT®: reviewed  Tests in the medicine section of CPT®: reviewed    Risk of Complications, Morbidity, and/or Mortality  Presenting problems: low  Diagnostic procedures: low  Management options: moderate    Patient Progress  Patient progress: stable      Final diagnoses:   Stenosis of left carotid artery            Mahogany Rios, APRN  03/21/18 2120

## 2018-04-16 ENCOUNTER — APPOINTMENT (OUTPATIENT)
Dept: MAMMOGRAPHY | Facility: HOSPITAL | Age: 73
End: 2018-04-16

## 2018-04-17 ENCOUNTER — OFFICE VISIT (OUTPATIENT)
Dept: CARDIAC SURGERY | Facility: CLINIC | Age: 73
End: 2018-04-17

## 2018-04-17 VITALS
TEMPERATURE: 97.8 F | OXYGEN SATURATION: 98 % | BODY MASS INDEX: 27.23 KG/M2 | SYSTOLIC BLOOD PRESSURE: 169 MMHG | HEIGHT: 62 IN | DIASTOLIC BLOOD PRESSURE: 100 MMHG | WEIGHT: 148 LBS | HEART RATE: 59 BPM

## 2018-04-17 DIAGNOSIS — I65.22 STENOSIS OF LEFT CAROTID ARTERY: Primary | ICD-10-CM

## 2018-04-17 PROCEDURE — 99203 OFFICE O/P NEW LOW 30 MIN: CPT | Performed by: THORACIC SURGERY (CARDIOTHORACIC VASCULAR SURGERY)

## 2018-04-17 RX ORDER — CHOLECALCIFEROL (VITAMIN D3) 25 MCG
500 TABLET,CHEWABLE ORAL DAILY
COMMUNITY

## 2018-04-17 RX ORDER — CHLORZOXAZONE 500 MG/1
TABLET ORAL
COMMUNITY
Start: 2018-03-24 | End: 2020-08-17

## 2018-04-17 RX ORDER — ASPIRIN 81 MG/1
81 TABLET, CHEWABLE ORAL DAILY
COMMUNITY

## 2019-02-06 ENCOUNTER — HOSPITAL ENCOUNTER (OUTPATIENT)
Dept: MAMMOGRAPHY | Facility: HOSPITAL | Age: 74
Discharge: HOME | End: 2019-02-06
Admitting: INTERNAL MEDICINE

## 2019-02-06 DIAGNOSIS — Z12.39 SCREENING BREAST EXAMINATION: ICD-10-CM

## 2019-02-06 PROCEDURE — 77067 SCR MAMMO BI INCL CAD: CPT | Performed by: RADIOLOGY

## 2019-02-06 PROCEDURE — 77067 SCR MAMMO BI INCL CAD: CPT

## 2019-02-06 PROCEDURE — 77063 BREAST TOMOSYNTHESIS BI: CPT | Performed by: RADIOLOGY

## 2019-02-06 PROCEDURE — 77063 BREAST TOMOSYNTHESIS BI: CPT

## 2019-04-30 ENCOUNTER — TELEPHONE (OUTPATIENT)
Dept: CARDIAC SURGERY | Facility: CLINIC | Age: 74
End: 2019-04-30

## 2019-04-30 NOTE — TELEPHONE ENCOUNTER
PT CALLING TO SCHEDULE YEARLY F/U IN [place], PT DOESN'T WANT TO BE SEEN NEXT WEEK. DEMOGRAPHIC VERIFIED

## 2019-05-01 DIAGNOSIS — I65.23 CAROTID STENOSIS, ASYMPTOMATIC, BILATERAL: Primary | ICD-10-CM

## 2019-05-30 ENCOUNTER — HOSPITAL ENCOUNTER (OUTPATIENT)
Dept: CARDIOLOGY | Facility: HOSPITAL | Age: 74
Discharge: HOME | End: 2019-05-30
Admitting: PHYSICIAN ASSISTANT

## 2019-05-30 DIAGNOSIS — I65.23 CAROTID STENOSIS, ASYMPTOMATIC, BILATERAL: ICD-10-CM

## 2019-05-30 PROCEDURE — 93880 EXTRACRANIAL BILAT STUDY: CPT | Performed by: RADIOLOGY

## 2019-05-30 PROCEDURE — 93880 EXTRACRANIAL BILAT STUDY: CPT

## 2019-07-10 ENCOUNTER — OFFICE VISIT (OUTPATIENT)
Dept: CARDIAC SURGERY | Facility: CLINIC | Age: 74
End: 2019-07-10

## 2019-07-10 VITALS
WEIGHT: 141.6 LBS | SYSTOLIC BLOOD PRESSURE: 186 MMHG | DIASTOLIC BLOOD PRESSURE: 94 MMHG | HEIGHT: 62 IN | OXYGEN SATURATION: 96 % | BODY MASS INDEX: 26.06 KG/M2 | HEART RATE: 53 BPM

## 2019-07-10 DIAGNOSIS — I65.22 STENOSIS OF LEFT CAROTID ARTERY: Primary | ICD-10-CM

## 2019-07-10 PROCEDURE — 99213 OFFICE O/P EST LOW 20 MIN: CPT | Performed by: PHYSICIAN ASSISTANT

## 2019-07-10 NOTE — PROGRESS NOTES
"07/10/2019  Patient Information  Nancy eHaly                                                                                          [address]  [facility] [address]   1945  'PCP/Referring Physician'  Eliel Barcenas MD  [phone]  No ref. provider found    Chief Complaint   Patient presents with   • Follow-up     1 YR F/U WITH CAROTID U/S. PT STATES HER LEFT EAR CONSTANTLY FELS LIKE IT'S \" STOPPED UP \"   • CAROTID STENOSIS       History of Present Illness:  Patient is a 74-year-old  female with history of hypertension, hyperlipidemia and left carotid artery stenosis who presents to office today for one-year follow-up for review and discussion of recent carotid duplex.  The patient was last seen on 4/17/2018 and denies any interval headache, dizziness, unilateral weakness, speech difficulty, visual changes or difficulty with ambulation.  She has been taking her aspirin, beta-blocker and statin as directed over the past year.  The patient does complain of some left ear pain, for which she is going to be evaluated by an ear, nose and throat physician in the near future.  The patient is otherwise doing well.    Patient Active Problem List   Diagnosis   • Biliary dyskinesia   • Stenosis of left carotid artery     Past Medical History:   Diagnosis Date   • Arthritis    • Basal cell carcinoma    • Gallstones    • GERD (gastroesophageal reflux disease)    • Hyperlipidemia    • Hypertension    • Spinal headache    • Stenosis of left carotid artery      Past Surgical History:   Procedure Laterality Date   • ABDOMINAL SURGERY     • CARDIAC CATHETERIZATION     • HYSTERECTOMY     • [place] LAP,CHOLECYSTECTOMY N/A 9/15/2017    Procedure: CHOLECYSTECTOMY LAPAROSCOPIC;  Surgeon: Matthew Snyder MD;  Location: [facility];  Service: General   • SKIN BIOPSY     • SKIN LESION EXCISION     • VASCULAR SURGERY         Current Outpatient Medications:   •  alendronate (FOSAMAX) 70 MG tablet, Take 70 mg by mouth " Every 7 (Seven) Days., Disp: , Rfl:   •  aspirin 81 MG chewable tablet, Chew 81 mg Daily., Disp: , Rfl:   •  chlorzoxazone (PARAFON FORTE) 500 MG tablet, , Disp: , Rfl:   •  Cyanocobalamin (B-12) 1000 MCG capsule, Take  by mouth., Disp: , Rfl:   •  fenofibrate 160 MG tablet, , Disp: , Rfl:   •  Glucosamine-Chondroitin (OSTEO BI-FLEX REGULAR STRENGTH PO), Take  by mouth Daily., Disp: , Rfl:   •  HYDROcodone-acetaminophen (NORCO) 5-325 MG per tablet, Take 1-2 tablets by mouth Every 4 (Four) Hours As Needed (Pain)., Disp: 20 tablet, Rfl: 0  •  metoprolol tartrate (LOPRESSOR) 50 MG tablet, 50 mg 2 (Two) Times a Day., Disp: , Rfl:   •  pantoprazole (PROTONIX) 40 MG EC tablet, 40 mg Daily., Disp: , Rfl:   •  Pediatric Multivitamins-Fl (MULTI VIT/FL PO), Take  by mouth., Disp: , Rfl:   •  pravastatin (PRAVACHOL) 40 MG tablet, 40 mg Daily., Disp: , Rfl:   •  terconazole (TERAZOL 7) 0.4 % vaginal cream, , Disp: , Rfl:   Allergies   Allergen Reactions   • Adhesive Tape Other (See Comments)     Skin blisters   • Penicillins Other (See Comments)     Severe yeast infection     Social History     Socioeconomic History   • Marital status:      Spouse name: Not on file   • Number of children: 2   • Years of education: Not on file   • Highest education level: Not on file   Occupational History   • Occupation: homemaker    Tobacco Use   • Smoking status: Never Smoker   • Smokeless tobacco: Never Used   Substance and Sexual Activity   • Alcohol use: No   • Drug use: No   • Sexual activity: Defer   Social History Narrative    Lives in [place] with spouse.      Family History   Problem Relation Age of Onset   • Heart disease Mother    • Diabetes Mother    • Heart disease Father    • Cancer Sister    • Heart disease Sister    • Cancer Brother    • Cancer Brother    • Heart disease Brother    • Cancer Brother    • Heart disease Brother    • Diabetes Brother    • Cancer Sister    • Heart disease Sister    • Diabetes Sister    • Heart  "disease Sister    • Cancer Sister    • Breast cancer Neg Hx      Review of Systems   Constitution: Positive for night sweats. Negative for chills, diaphoresis, fever, weakness, malaise/fatigue, weight gain and weight loss.   HENT: Negative for congestion, ear pain, hearing loss, nosebleeds and sore throat.    Eyes: Negative for blurred vision and double vision.   Cardiovascular: Positive for leg swelling. Negative for chest pain, claudication, dyspnea on exertion, near-syncope, palpitations and syncope.   Respiratory: Negative for cough, hemoptysis, shortness of breath and wheezing.    Hematologic/Lymphatic: Bruises/bleeds easily (BRUISING).   Skin: Negative for itching, poor wound healing and rash.   Musculoskeletal: Positive for arthritis and neck pain. Negative for back pain, falls, joint pain, joint swelling, muscle cramps and muscle weakness.   Gastrointestinal: Negative for abdominal pain, constipation, diarrhea, dysphagia, hematochezia, nausea and vomiting.   Genitourinary: Negative for frequency, hematuria, hesitancy, incomplete emptying and urgency.   Neurological: Negative for dizziness, headaches, light-headedness, loss of balance, numbness, seizures and tremors.   Psychiatric/Behavioral: Negative for depression and suicidal ideas. The patient is not nervous/anxious.    Allergic/Immunologic: Positive for environmental allergies. Negative for HIV exposure.     Vitals:    07/10/19 1218   BP: (!) 186/94   BP Location: Left arm   Patient Position: Sitting   Pulse: 53   SpO2: 96%   Weight: 64.2 kg (141 lb 9.6 oz)   Height: 157.5 cm (62\")      Physical Exam:  Gen - NAD, pleasant, cooperative  CV - Regular rate and rhythm, no murmur gallop or rub  Pulm - Lungs clear to auscultation without wheeze or rhonchi   GI - Soft, normoactive bowel sounds, non-tender  Ext - Without edema  Neuro - CN II - XII grossly intact, tongue midline, voice normal    Labs/Imagin2019 carotid duplex  RIGHT:  No significant " intimal thickening or plaque is noted. No occlusion.     RIGHT ICA PSV: 1251.00 mm/s  RIGHT ICA EDV: 371.00 mm/s.   Right ICA/CCA Ratio: 0.95  Anterograde flow is demonstrated in RIGHT vertebral artery.     LEFT:  Moderate stenosis in the left internal carotid artery.     LEFT ICA PSV: 2419.00 mm/s  LEFT EDV: 516.00 mm/s.   Left ICA/CCA Ratio: 2.28  Anterograde flow is demonstrated in LEFT vertebral artery.        IMPRESSION:  Moderate stenosis in the left internal carotid artery.    Assessment/Plan:  Patient is a 74-year-old  female with history of hypertension, hyperlipidemia and left carotid artery stenosis who presents to office today for one-year follow-up for review and discussion of recent carotid duplex.  The patient has been doing well since last being seen in office.  She has been without any neuro focal deficit over the last year, and her carotid duplex shows improvement in her ratio and velocity of the left carotid artery.  The patient is encouraged to continue her medical management, and follow-up in 1 year with carotid duplex.  If she has any questions, concerns or acutely worsening symptoms during the interval she may call our office or present to the nearest emergency department immediately.    Patient Active Problem List   Diagnosis   • Biliary dyskinesia   • Stenosis of left carotid artery

## 2019-07-26 ENCOUNTER — APPOINTMENT (OUTPATIENT)
Dept: GENERAL RADIOLOGY | Facility: HOSPITAL | Age: 74
End: 2019-07-26

## 2019-07-26 ENCOUNTER — HOSPITAL ENCOUNTER (EMERGENCY)
Facility: HOSPITAL | Age: 74
Discharge: HOME | End: 2019-07-26
Attending: EMERGENCY MEDICINE | Admitting: EMERGENCY MEDICINE

## 2019-07-26 VITALS
DIASTOLIC BLOOD PRESSURE: 72 MMHG | HEIGHT: 61 IN | BODY MASS INDEX: 26.62 KG/M2 | OXYGEN SATURATION: 99 % | RESPIRATION RATE: 16 BRPM | TEMPERATURE: 98.3 F | HEART RATE: 72 BPM | SYSTOLIC BLOOD PRESSURE: 189 MMHG | WEIGHT: 141 LBS

## 2019-07-26 DIAGNOSIS — S81.811A LACERATION OF RIGHT LOWER LEG, INITIAL ENCOUNTER: Primary | ICD-10-CM

## 2019-07-26 PROCEDURE — 90715 TDAP VACCINE 7 YRS/> IM: CPT | Performed by: PHYSICIAN ASSISTANT

## 2019-07-26 PROCEDURE — 73590 X-RAY EXAM OF LOWER LEG: CPT | Performed by: RADIOLOGY

## 2019-07-26 PROCEDURE — 25010000002 TDAP 5-2.5-18.5 LF-MCG/0.5 SUSPENSION: Performed by: PHYSICIAN ASSISTANT

## 2019-07-26 PROCEDURE — 73590 X-RAY EXAM OF LOWER LEG: CPT

## 2019-07-26 PROCEDURE — 90471 IMMUNIZATION ADMIN: CPT | Performed by: PHYSICIAN ASSISTANT

## 2019-07-26 PROCEDURE — 99284 EMERGENCY DEPT VISIT MOD MDM: CPT

## 2019-07-26 RX ORDER — CLINDAMYCIN HYDROCHLORIDE 300 MG/1
300 CAPSULE ORAL 3 TIMES DAILY
Qty: 30 CAPSULE | Refills: 0 | Status: SHIPPED | OUTPATIENT
Start: 2019-07-26 | End: 2020-08-17

## 2019-07-26 RX ORDER — LIDOCAINE HYDROCHLORIDE AND EPINEPHRINE 10; 10 MG/ML; UG/ML
20 INJECTION, SOLUTION INFILTRATION; PERINEURAL ONCE
Status: COMPLETED | OUTPATIENT
Start: 2019-07-26 | End: 2019-07-26

## 2019-07-26 RX ORDER — ONDANSETRON 4 MG/1
4 TABLET, ORALLY DISINTEGRATING ORAL ONCE
Status: COMPLETED | OUTPATIENT
Start: 2019-07-26 | End: 2019-07-26

## 2019-07-26 RX ORDER — ACETAMINOPHEN AND CODEINE PHOSPHATE 300; 30 MG/1; MG/1
1 TABLET ORAL EVERY 6 HOURS PRN
Qty: 12 TABLET | Refills: 0 | Status: SHIPPED | OUTPATIENT
Start: 2019-07-26 | End: 2020-08-17

## 2019-07-26 RX ORDER — HYDROCODONE BITARTRATE AND ACETAMINOPHEN 7.5; 325 MG/1; MG/1
1 TABLET ORAL ONCE
Status: COMPLETED | OUTPATIENT
Start: 2019-07-26 | End: 2019-07-26

## 2019-07-26 RX ORDER — HYDROCODONE BITARTRATE AND ACETAMINOPHEN 5; 325 MG/1; MG/1
1 TABLET ORAL EVERY 6 HOURS PRN
Status: COMPLETED | OUTPATIENT
Start: 2019-07-26 | End: 2019-07-26

## 2019-07-26 RX ADMIN — HYDROCODONE BITARTRATE AND ACETAMINOPHEN 1 TABLET: 5; 325 TABLET ORAL at 20:53

## 2019-07-26 RX ADMIN — LIDOCAINE HYDROCHLORIDE AND EPINEPHRINE 20 ML: 10; 10 INJECTION, SOLUTION INFILTRATION; PERINEURAL at 20:20

## 2019-07-26 RX ADMIN — ONDANSETRON 4 MG: 4 TABLET, ORALLY DISINTEGRATING ORAL at 19:46

## 2019-07-26 RX ADMIN — HYDROCODONE BITARTRATE AND ACETAMINOPHEN 1 TABLET: 5; 325 TABLET ORAL at 20:59

## 2019-07-26 RX ADMIN — TETANUS TOXOID, REDUCED DIPHTHERIA TOXOID AND ACELLULAR PERTUSSIS VACCINE, ADSORBED 0.5 ML: 5; 2.5; 8; 8; 2.5 SUSPENSION INTRAMUSCULAR at 19:41

## 2019-07-26 RX ADMIN — HYDROCODONE BITARTRATE AND ACETAMINOPHEN 1 TABLET: 7.5; 325 TABLET ORAL at 19:46

## 2019-07-29 ENCOUNTER — EPISODE CHANGES (OUTPATIENT)
Dept: CASE MANAGEMENT | Facility: OTHER | Age: 74
End: 2019-07-29

## 2019-11-11 ENCOUNTER — HOSPITAL ENCOUNTER (OUTPATIENT)
Dept: CARDIOLOGY | Facility: HOSPITAL | Age: 74
Discharge: HOME | End: 2019-11-11
Admitting: NURSE PRACTITIONER

## 2019-11-11 ENCOUNTER — TRANSCRIBE ORDERS (OUTPATIENT)
Dept: ADMINISTRATIVE | Facility: HOSPITAL | Age: 74
End: 2019-11-11

## 2019-11-11 DIAGNOSIS — M79.604 PAIN OF RIGHT LEG: Primary | ICD-10-CM

## 2019-11-11 DIAGNOSIS — M79.604 PAIN OF RIGHT LEG: ICD-10-CM

## 2019-11-11 PROCEDURE — 93971 EXTREMITY STUDY: CPT

## 2019-11-11 PROCEDURE — 93971 EXTREMITY STUDY: CPT | Performed by: RADIOLOGY

## 2020-01-27 ENCOUNTER — HOSPITAL ENCOUNTER (OUTPATIENT)
Dept: BONE DENSITY | Facility: HOSPITAL | Age: 75
Discharge: HOME | End: 2020-01-27
Admitting: INTERNAL MEDICINE

## 2020-01-27 DIAGNOSIS — Z78.0 POST-MENOPAUSAL: ICD-10-CM

## 2020-01-27 PROCEDURE — 77080 DXA BONE DENSITY AXIAL: CPT

## 2020-01-27 PROCEDURE — 77080 DXA BONE DENSITY AXIAL: CPT | Performed by: RADIOLOGY

## 2020-03-13 ENCOUNTER — HOSPITAL ENCOUNTER (OUTPATIENT)
Dept: MAMMOGRAPHY | Facility: HOSPITAL | Age: 75
Discharge: HOME | End: 2020-03-13
Admitting: INTERNAL MEDICINE

## 2020-03-13 DIAGNOSIS — Z12.31 VISIT FOR SCREENING MAMMOGRAM: ICD-10-CM

## 2020-03-13 PROCEDURE — 77067 SCR MAMMO BI INCL CAD: CPT

## 2020-03-13 PROCEDURE — 77067 SCR MAMMO BI INCL CAD: CPT | Performed by: RADIOLOGY

## 2020-03-13 PROCEDURE — 77063 BREAST TOMOSYNTHESIS BI: CPT

## 2020-03-13 PROCEDURE — 77063 BREAST TOMOSYNTHESIS BI: CPT | Performed by: RADIOLOGY

## 2020-07-20 ENCOUNTER — TELEPHONE (OUTPATIENT)
Dept: CARDIAC SURGERY | Facility: CLINIC | Age: 75
End: 2020-07-20

## 2020-07-22 DIAGNOSIS — I65.22 STENOSIS OF LEFT CAROTID ARTERY: ICD-10-CM

## 2020-07-22 DIAGNOSIS — I65.23 CAROTID STENOSIS, ASYMPTOMATIC, BILATERAL: Primary | ICD-10-CM

## 2020-07-27 ENCOUNTER — HOSPITAL ENCOUNTER (OUTPATIENT)
Dept: ULTRASOUND IMAGING | Facility: HOSPITAL | Age: 75
End: 2020-07-27

## 2020-07-27 ENCOUNTER — HOSPITAL ENCOUNTER (OUTPATIENT)
Dept: CARDIOLOGY | Facility: HOSPITAL | Age: 75
Discharge: HOME | End: 2020-07-27
Admitting: PHYSICIAN ASSISTANT

## 2020-07-27 DIAGNOSIS — I65.23 CAROTID STENOSIS, ASYMPTOMATIC, BILATERAL: ICD-10-CM

## 2020-07-27 PROCEDURE — 93880 EXTRACRANIAL BILAT STUDY: CPT | Performed by: RADIOLOGY

## 2020-07-27 PROCEDURE — 93880 EXTRACRANIAL BILAT STUDY: CPT

## 2020-07-29 DIAGNOSIS — I65.23 BILATERAL CAROTID ARTERY STENOSIS: ICD-10-CM

## 2020-07-29 DIAGNOSIS — R42 DIZZINESS: Primary | ICD-10-CM

## 2020-07-30 ENCOUNTER — TELEPHONE (OUTPATIENT)
Dept: CARDIAC SURGERY | Facility: CLINIC | Age: 75
End: 2020-07-30

## 2020-07-30 NOTE — TELEPHONE ENCOUNTER
----- Message from Denver Silver MD sent at 7/29/2020  6:28 AM EDT -----  She needs an appointment sooner given her carotid stenosis.  Can you arrange a CTA of the carotids before her appointment?  Thanks

## 2020-08-04 ENCOUNTER — HOSPITAL ENCOUNTER (OUTPATIENT)
Dept: CT IMAGING | Facility: HOSPITAL | Age: 75
Discharge: HOME | End: 2020-08-04
Admitting: PHYSICIAN ASSISTANT

## 2020-08-04 DIAGNOSIS — R42 DIZZINESS: ICD-10-CM

## 2020-08-04 DIAGNOSIS — I65.23 BILATERAL CAROTID ARTERY STENOSIS: ICD-10-CM

## 2020-08-04 LAB — CREAT BLDA-MCNC: 1 MG/DL (ref 0.6–1.3)

## 2020-08-04 PROCEDURE — 0 IOVERSOL 68 % SOLUTION: Performed by: PHYSICIAN ASSISTANT

## 2020-08-04 PROCEDURE — 82565 ASSAY OF CREATININE: CPT

## 2020-08-04 PROCEDURE — 70498 CT ANGIOGRAPHY NECK: CPT

## 2020-08-04 PROCEDURE — 70498 CT ANGIOGRAPHY NECK: CPT | Performed by: RADIOLOGY

## 2020-08-04 RX ADMIN — IOVERSOL 60 ML: 678 INJECTION INTRA-ARTERIAL; INTRAVENOUS at 11:09

## 2020-08-05 ENCOUNTER — OFFICE VISIT (OUTPATIENT)
Dept: CARDIAC SURGERY | Facility: CLINIC | Age: 75
End: 2020-08-05

## 2020-08-05 VITALS
HEART RATE: 74 BPM | WEIGHT: 147 LBS | HEIGHT: 62 IN | TEMPERATURE: 97.7 F | BODY MASS INDEX: 27.05 KG/M2 | DIASTOLIC BLOOD PRESSURE: 84 MMHG | OXYGEN SATURATION: 97 % | SYSTOLIC BLOOD PRESSURE: 209 MMHG

## 2020-08-05 DIAGNOSIS — I10 ESSENTIAL HYPERTENSION: ICD-10-CM

## 2020-08-05 DIAGNOSIS — I65.23 BILATERAL CAROTID ARTERY STENOSIS: Primary | ICD-10-CM

## 2020-08-05 PROCEDURE — 99213 OFFICE O/P EST LOW 20 MIN: CPT | Performed by: NURSE PRACTITIONER

## 2020-08-05 RX ORDER — LEVOTHYROXINE SODIUM 0.03 MG/1
25 TABLET ORAL DAILY
COMMUNITY

## 2020-08-05 RX ORDER — HYDRALAZINE HYDROCHLORIDE 50 MG/1
50 TABLET, FILM COATED ORAL 2 TIMES DAILY
COMMUNITY

## 2020-08-05 NOTE — PROGRESS NOTES
[facility] Cardiothoracic Surgery Follow-Up Note    Name:  Nancy Healy  MRN Number:  [id number]  Date of Encounter:  08/05/2020    Referred By:  No ref. provider found  PCP:  Eliel Barcenas MD    Chief Complaint:    Chief Complaint   Patient presents with   • Carotid Artery Disease     1 year follow-up with results from carotid duplex, also had CTA carotids 8/4/2020.       History of Present Illness:    Ms. Nancy Healy is a pleasant 75 y.o. female with a history of hypertension, hyperlipidemia and left carotid artery stenosis  who presents to office today for one-year follow-up for review and discussion of recent carotid duplex.  The patient was last seen on 7/10/2019 and denies any interval headache, dizziness, unilateral weakness, speech difficulty, visual changes or difficulty with ambulation.    Overall, the patient is doing well.    Review of Systems:  Review of Systems   Constitution: Negative for chills, fever, malaise/fatigue, night sweats and weight loss.   HENT: Negative for hearing loss, odynophagia and sore throat.    Cardiovascular: Positive for dyspnea on exertion and leg swelling. Negative for chest pain, orthopnea and palpitations.   Respiratory: Negative for cough and hemoptysis.    Endocrine: Negative for cold intolerance, heat intolerance, polydipsia, polyphagia and polyuria.   Hematologic/Lymphatic: Does not bruise/bleed easily.   Skin: Negative for itching and rash.   Musculoskeletal: Positive for back pain. Negative for joint pain, joint swelling and myalgias.   Gastrointestinal: Negative for abdominal pain, constipation, diarrhea, hematemesis, hematochezia, melena, nausea and vomiting.   Genitourinary: Negative for dysuria, frequency and hematuria.   Neurological: Negative for focal weakness, headaches, numbness and seizures.   Psychiatric/Behavioral: Negative for suicidal ideas.   All other systems reviewed and are negative.      Past Medical History:    Past Medical  History:   Diagnosis Date   • Arthritis    • Basal cell carcinoma    • Gallstones    • GERD (gastroesophageal reflux disease)    • Hyperlipidemia    • Hypertension    • Spinal headache    • Stenosis of left carotid artery        Past Surgical History:    Past Surgical History:   Procedure Laterality Date   • ABDOMINAL SURGERY     • CARDIAC CATHETERIZATION     • HYSTERECTOMY     • CT LAP,CHOLECYSTECTOMY N/A 9/15/2017    Procedure: CHOLECYSTECTOMY LAPAROSCOPIC;  Surgeon: Matthew Snyder MD;  Location: [facility];  Service: General   • SKIN BIOPSY     • SKIN LESION EXCISION     • VASCULAR SURGERY         Patient Active Problem List   Diagnosis   • Biliary dyskinesia   • Stenosis of left carotid artery     Social History     Tobacco Use   • Smoking status: Never Smoker   • Smokeless tobacco: Never Used   Substance Use Topics   • Alcohol use: No   • Drug use: No     Family History   Problem Relation Age of Onset   • Heart disease Mother    • Diabetes Mother    • Heart disease Father    • Cancer Sister    • Heart disease Sister    • Cancer Brother    • Cancer Brother    • Heart disease Brother    • Cancer Brother    • Heart disease Brother    • Diabetes Brother    • Cancer Sister    • Heart disease Sister    • Diabetes Sister    • Heart disease Sister    • Cancer Sister    • Breast cancer Neg Hx        Medications:      Current Outpatient Medications:   •  acetaminophen-codeine (TYLENOL #3) 300-30 MG per tablet, Take 1 tablet by mouth Every 6 (Six) Hours As Needed for Moderate Pain ., Disp: 12 tablet, Rfl: 0  •  alendronate (FOSAMAX) 70 MG tablet, Take 70 mg by mouth Every 7 (Seven) Days., Disp: , Rfl:   •  aspirin 81 MG chewable tablet, Chew 81 mg Daily., Disp: , Rfl:   •  Cyanocobalamin (B-12) 1000 MCG capsule, Take  by mouth., Disp: , Rfl:   •  fenofibrate 160 MG tablet, , Disp: , Rfl:   •  Glucosamine-Chondroitin (OSTEO BI-FLEX REGULAR STRENGTH PO), Take  by mouth Daily., Disp: , Rfl:   •  hydrALAZINE (APRESOLINE)  "50 MG tablet, Take 50 mg by mouth 2 (two) times a day., Disp: , Rfl:   •  levothyroxine (SYNTHROID, LEVOTHROID) 25 MCG tablet, Take 25 mcg by mouth Daily., Disp: , Rfl:   •  metoprolol tartrate (LOPRESSOR) 50 MG tablet, 50 mg 2 (Two) Times a Day., Disp: , Rfl:   •  Multiple Vitamins-Calcium (ONE-A-DAY WOMENS PO), Take  by mouth., Disp: , Rfl:   •  OMEGA-3 KRILL OIL PO, Take  by mouth., Disp: , Rfl:   •  pantoprazole (PROTONIX) 40 MG EC tablet, 40 mg Daily., Disp: , Rfl:   •  pravastatin (PRAVACHOL) 40 MG tablet, 40 mg Daily., Disp: , Rfl:   •  terconazole (TERAZOL 7) 0.4 % vaginal cream, , Disp: , Rfl:   •  TURMERIC CURCUMIN PO, Take  by mouth., Disp: , Rfl:   •  chlorzoxazone (PARAFON FORTE) 500 MG tablet, , Disp: , Rfl:   •  clindamycin (CLEOCIN) 300 MG capsule, Take 1 capsule by mouth 3 (Three) Times a Day., Disp: 30 capsule, Rfl: 0  •  HYDROcodone-acetaminophen (NORCO) 5-325 MG per tablet, Take 1-2 tablets by mouth Every 4 (Four) Hours As Needed (Pain)., Disp: 20 tablet, Rfl: 0  •  Pediatric Multivitamins-Fl (MULTI VIT/FL PO), Take  by mouth., Disp: , Rfl:     Allergies:  Allergies   Allergen Reactions   • Adhesive Tape Other (See Comments)     Skin blisters   • Penicillins Other (See Comments)     Severe yeast infection       Physical Exam:  Vital Signs:    Vitals:    08/05/20 1158   BP: (!) 209/84   BP Location: Right arm   Patient Position: Sitting   Pulse: 74   Temp: 97.7 °F (36.5 °C)   SpO2: 97%   Weight: 66.7 kg (147 lb)   Height: 157.5 cm (62\")       Physical Exam   Gen- NAD, pleasant, cooperative  CV- Regular rate and rhythm, no murmur, gallop or rub  Pulm- Clear to auscultation bilateral without wheeze or rhonchi  GI- Soft, normoactive bowel sounds, non-tender  Ext- Without edema  Neuro- CN II- XII grossly intact, tongue midline, voice normal.    Labs/Imaging:  CT Angiogram Carotids, [facility], 8/4/2020  Impression:  Severe stenosis of the left proximal internal carotid artery for segment " measuring approximately 1.5 cm noted.  There is also mild proximal right internal carotid artery stenosis noted as well.  Dr. Silver and I have personally reviewed these images in the office today.    Assessment / Plan:  Ms. Nancy Healy is a pleasant 75 y.o. female with a history of hypertension, hyperlipidemia and left carotid artery stenosis  who presents to office today for one-year follow-up for review and discussion of recent imaging.  The patient CTA of her carotids was discussed with her in the office today.  This demonstrated an approximately 95% critical left internal carotid artery stenosis.  I discussed the need for a left carotid endarterectomy with the patient in the office today.  I discussed risk and benefits of this procedure including the risk of pain, bleeding, infection, difficulty speaking, hoarseness, stroke, myocardial infarction and death with the patient the office today.  Patient verbalized understanding's and wishes to proceed with a left carotid endarterectomy.  The patient's blood pressure was noted to be extremely elevated in the office today as well.  The patient reports that she is currently working with her primary care physician to try to titrate her medications.  Have encouraged her to continue keeping a blood pressure log and the importance of maintaining normal blood pressure.    Please note, this document was produced using voice recognition software.    NELA Monsivais  [facility] Cardiothoracic Surgery

## 2020-08-05 NOTE — H&P (VIEW-ONLY)
[facility] Cardiothoracic Surgery Follow-Up Note    Name:  Nancy Healy  MRN Number:  [id number]  Date of Encounter:  08/05/2020    Referred By:  No ref. provider found  PCP:  Eliel Barcenas MD    Chief Complaint:    Chief Complaint   Patient presents with   • Carotid Artery Disease     1 year follow-up with results from carotid duplex, also had CTA carotids 8/4/2020.       History of Present Illness:    Ms. Nancy Healy is a pleasant 75 y.o. female with a history of hypertension, hyperlipidemia and left carotid artery stenosis  who presents to office today for one-year follow-up for review and discussion of recent carotid duplex.  The patient was last seen on 7/10/2019 and denies any interval headache, dizziness, unilateral weakness, speech difficulty, visual changes or difficulty with ambulation.    Overall, the patient is doing well.    Review of Systems:  Review of Systems   Constitution: Negative for chills, fever, malaise/fatigue, night sweats and weight loss.   HENT: Negative for hearing loss, odynophagia and sore throat.    Cardiovascular: Positive for dyspnea on exertion and leg swelling. Negative for chest pain, orthopnea and palpitations.   Respiratory: Negative for cough and hemoptysis.    Endocrine: Negative for cold intolerance, heat intolerance, polydipsia, polyphagia and polyuria.   Hematologic/Lymphatic: Does not bruise/bleed easily.   Skin: Negative for itching and rash.   Musculoskeletal: Positive for back pain. Negative for joint pain, joint swelling and myalgias.   Gastrointestinal: Negative for abdominal pain, constipation, diarrhea, hematemesis, hematochezia, melena, nausea and vomiting.   Genitourinary: Negative for dysuria, frequency and hematuria.   Neurological: Negative for focal weakness, headaches, numbness and seizures.   Psychiatric/Behavioral: Negative for suicidal ideas.   All other systems reviewed and are negative.      Past Medical History:    Past Medical  History:   Diagnosis Date   • Arthritis    • Basal cell carcinoma    • Gallstones    • GERD (gastroesophageal reflux disease)    • Hyperlipidemia    • Hypertension    • Spinal headache    • Stenosis of left carotid artery        Past Surgical History:    Past Surgical History:   Procedure Laterality Date   • ABDOMINAL SURGERY     • CARDIAC CATHETERIZATION     • HYSTERECTOMY     • AR LAP,CHOLECYSTECTOMY N/A 9/15/2017    Procedure: CHOLECYSTECTOMY LAPAROSCOPIC;  Surgeon: Matthew Snyder MD;  Location: [facility];  Service: General   • SKIN BIOPSY     • SKIN LESION EXCISION     • VASCULAR SURGERY         Patient Active Problem List   Diagnosis   • Biliary dyskinesia   • Stenosis of left carotid artery     Social History     Tobacco Use   • Smoking status: Never Smoker   • Smokeless tobacco: Never Used   Substance Use Topics   • Alcohol use: No   • Drug use: No     Family History   Problem Relation Age of Onset   • Heart disease Mother    • Diabetes Mother    • Heart disease Father    • Cancer Sister    • Heart disease Sister    • Cancer Brother    • Cancer Brother    • Heart disease Brother    • Cancer Brother    • Heart disease Brother    • Diabetes Brother    • Cancer Sister    • Heart disease Sister    • Diabetes Sister    • Heart disease Sister    • Cancer Sister    • Breast cancer Neg Hx        Medications:      Current Outpatient Medications:   •  acetaminophen-codeine (TYLENOL #3) 300-30 MG per tablet, Take 1 tablet by mouth Every 6 (Six) Hours As Needed for Moderate Pain ., Disp: 12 tablet, Rfl: 0  •  alendronate (FOSAMAX) 70 MG tablet, Take 70 mg by mouth Every 7 (Seven) Days., Disp: , Rfl:   •  aspirin 81 MG chewable tablet, Chew 81 mg Daily., Disp: , Rfl:   •  Cyanocobalamin (B-12) 1000 MCG capsule, Take  by mouth., Disp: , Rfl:   •  fenofibrate 160 MG tablet, , Disp: , Rfl:   •  Glucosamine-Chondroitin (OSTEO BI-FLEX REGULAR STRENGTH PO), Take  by mouth Daily., Disp: , Rfl:   •  hydrALAZINE (APRESOLINE)  "50 MG tablet, Take 50 mg by mouth 2 (two) times a day., Disp: , Rfl:   •  levothyroxine (SYNTHROID, LEVOTHROID) 25 MCG tablet, Take 25 mcg by mouth Daily., Disp: , Rfl:   •  metoprolol tartrate (LOPRESSOR) 50 MG tablet, 50 mg 2 (Two) Times a Day., Disp: , Rfl:   •  Multiple Vitamins-Calcium (ONE-A-DAY WOMENS PO), Take  by mouth., Disp: , Rfl:   •  OMEGA-3 KRILL OIL PO, Take  by mouth., Disp: , Rfl:   •  pantoprazole (PROTONIX) 40 MG EC tablet, 40 mg Daily., Disp: , Rfl:   •  pravastatin (PRAVACHOL) 40 MG tablet, 40 mg Daily., Disp: , Rfl:   •  terconazole (TERAZOL 7) 0.4 % vaginal cream, , Disp: , Rfl:   •  TURMERIC CURCUMIN PO, Take  by mouth., Disp: , Rfl:   •  chlorzoxazone (PARAFON FORTE) 500 MG tablet, , Disp: , Rfl:   •  clindamycin (CLEOCIN) 300 MG capsule, Take 1 capsule by mouth 3 (Three) Times a Day., Disp: 30 capsule, Rfl: 0  •  HYDROcodone-acetaminophen (NORCO) 5-325 MG per tablet, Take 1-2 tablets by mouth Every 4 (Four) Hours As Needed (Pain)., Disp: 20 tablet, Rfl: 0  •  Pediatric Multivitamins-Fl (MULTI VIT/FL PO), Take  by mouth., Disp: , Rfl:     Allergies:  Allergies   Allergen Reactions   • Adhesive Tape Other (See Comments)     Skin blisters   • Penicillins Other (See Comments)     Severe yeast infection       Physical Exam:  Vital Signs:    Vitals:    08/05/20 1158   BP: (!) 209/84   BP Location: Right arm   Patient Position: Sitting   Pulse: 74   Temp: 97.7 °F (36.5 °C)   SpO2: 97%   Weight: 66.7 kg (147 lb)   Height: 157.5 cm (62\")       Physical Exam   Gen- NAD, pleasant, cooperative  CV- Regular rate and rhythm, no murmur, gallop or rub  Pulm- Clear to auscultation bilateral without wheeze or rhonchi  GI- Soft, normoactive bowel sounds, non-tender  Ext- Without edema  Neuro- CN II- XII grossly intact, tongue midline, voice normal.    Labs/Imaging:  CT Angiogram Carotids, [facility], 8/4/2020  Impression:  Severe stenosis of the left proximal internal carotid artery for segment " measuring approximately 1.5 cm noted.  There is also mild proximal right internal carotid artery stenosis noted as well.  Dr. Silver and I have personally reviewed these images in the office today.    Assessment / Plan:  Ms. Nancy Healy is a pleasant 75 y.o. female with a history of hypertension, hyperlipidemia and left carotid artery stenosis  who presents to office today for one-year follow-up for review and discussion of recent imaging.  The patient CTA of her carotids was discussed with her in the office today.  This demonstrated an approximately 95% critical left internal carotid artery stenosis.  I discussed the need for a left carotid endarterectomy with the patient in the office today.  I discussed risk and benefits of this procedure including the risk of pain, bleeding, infection, difficulty speaking, hoarseness, stroke, myocardial infarction and death with the patient the office today.  Patient verbalized understanding's and wishes to proceed with a left carotid endarterectomy.  The patient's blood pressure was noted to be extremely elevated in the office today as well.  The patient reports that she is currently working with her primary care physician to try to titrate her medications.  Have encouraged her to continue keeping a blood pressure log and the importance of maintaining normal blood pressure.    Please note, this document was produced using voice recognition software.    NELA Monsivais  [facility] Cardiothoracic Surgery

## 2020-08-06 DIAGNOSIS — I65.22 STENOSIS OF LEFT CAROTID ARTERY: Primary | ICD-10-CM

## 2020-08-13 ENCOUNTER — PREP FOR SURGERY (OUTPATIENT)
Dept: OTHER | Facility: HOSPITAL | Age: 75
End: 2020-08-13

## 2020-08-13 DIAGNOSIS — I65.22 CAROTID STENOSIS, LEFT: Primary | ICD-10-CM

## 2020-08-13 RX ORDER — CHLORHEXIDINE GLUCONATE 500 MG/1
1 CLOTH TOPICAL EVERY 12 HOURS PRN
Status: CANCELLED | OUTPATIENT
Start: 2020-08-13

## 2020-08-13 RX ORDER — VANCOMYCIN HYDROCHLORIDE 1 G/200ML
15 INJECTION, SOLUTION INTRAVENOUS ONCE
Status: CANCELLED | OUTPATIENT
Start: 2020-08-13 | End: 2020-08-13

## 2020-08-17 ENCOUNTER — APPOINTMENT (OUTPATIENT)
Dept: PREADMISSION TESTING | Facility: HOSPITAL | Age: 75
End: 2020-08-17

## 2020-08-17 ENCOUNTER — HOSPITAL ENCOUNTER (OUTPATIENT)
Dept: GENERAL RADIOLOGY | Facility: HOSPITAL | Age: 75
Discharge: HOME | End: 2020-08-17
Admitting: NURSE PRACTITIONER

## 2020-08-17 VITALS — BODY MASS INDEX: 26.69 KG/M2 | WEIGHT: 145.06 LBS | HEIGHT: 62 IN

## 2020-08-17 DIAGNOSIS — I65.22 CAROTID STENOSIS, LEFT: ICD-10-CM

## 2020-08-17 LAB
ABO GROUP BLD: NORMAL
ANION GAP SERPL CALCULATED.3IONS-SCNC: 12 MMOL/L (ref 5–15)
BLD GP AB SCN SERPL QL: NEGATIVE
BUN SERPL-MCNC: 19 MG/DL (ref 8–23)
BUN/CREAT SERPL: 20.7 (ref 7–25)
CALCIUM SPEC-SCNC: 10 MG/DL (ref 8.6–10.5)
CHLORIDE SERPL-SCNC: 105 MMOL/L (ref 98–107)
CO2 SERPL-SCNC: 25 MMOL/L (ref 22–29)
CREAT SERPL-MCNC: 0.92 MG/DL (ref 0.57–1)
DEPRECATED RDW RBC AUTO: 43.5 FL (ref 37–54)
ERYTHROCYTE [DISTWIDTH] IN BLOOD BY AUTOMATED COUNT: 13 % (ref 12.3–15.4)
GFR SERPL CREATININE-BSD FRML MDRD: 60 ML/MIN/1.73
GLUCOSE SERPL-MCNC: 112 MG/DL (ref 65–99)
HBA1C MFR BLD: 5.6 % (ref 4.8–5.6)
HCT VFR BLD AUTO: 46.3 % (ref 34–46.6)
HGB BLD-MCNC: 14.9 G/DL (ref 12–15.9)
INR PPP: 1.01 (ref 0.85–1.16)
MCH RBC QN AUTO: 29.3 PG (ref 26.6–33)
MCHC RBC AUTO-ENTMCNC: 32.2 G/DL (ref 31.5–35.7)
MCV RBC AUTO: 91.1 FL (ref 79–97)
PLATELET # BLD AUTO: 308 10*3/MM3 (ref 140–450)
PMV BLD AUTO: 9.8 FL (ref 6–12)
POTASSIUM SERPL-SCNC: 4.1 MMOL/L (ref 3.5–5.2)
PROTHROMBIN TIME: 13 SECONDS (ref 11.5–14)
RBC # BLD AUTO: 5.08 10*6/MM3 (ref 3.77–5.28)
REF LAB TEST METHOD: NORMAL
RH BLD: POSITIVE
SARS-COV-2 RNA RESP QL NAA+PROBE: NOT DETECTED
SODIUM SERPL-SCNC: 142 MMOL/L (ref 136–145)
T&S EXPIRATION DATE: NORMAL
WBC # BLD AUTO: 9.77 10*3/MM3 (ref 3.4–10.8)

## 2020-08-17 PROCEDURE — 36415 COLL VENOUS BLD VENIPUNCTURE: CPT

## 2020-08-17 PROCEDURE — 71046 X-RAY EXAM CHEST 2 VIEWS: CPT

## 2020-08-17 PROCEDURE — 93005 ELECTROCARDIOGRAM TRACING: CPT

## 2020-08-17 PROCEDURE — 86901 BLOOD TYPING SEROLOGIC RH(D): CPT | Performed by: NURSE PRACTITIONER

## 2020-08-17 PROCEDURE — 86900 BLOOD TYPING SEROLOGIC ABO: CPT | Performed by: NURSE PRACTITIONER

## 2020-08-17 PROCEDURE — 86850 RBC ANTIBODY SCREEN: CPT | Performed by: NURSE PRACTITIONER

## 2020-08-17 PROCEDURE — 85027 COMPLETE CBC AUTOMATED: CPT | Performed by: NURSE PRACTITIONER

## 2020-08-17 PROCEDURE — 83036 HEMOGLOBIN GLYCOSYLATED A1C: CPT | Performed by: NURSE PRACTITIONER

## 2020-08-17 PROCEDURE — 85610 PROTHROMBIN TIME: CPT | Performed by: NURSE PRACTITIONER

## 2020-08-17 PROCEDURE — C9803 HOPD COVID-19 SPEC COLLECT: HCPCS

## 2020-08-17 PROCEDURE — U0002 COVID-19 LAB TEST NON-CDC: HCPCS

## 2020-08-17 PROCEDURE — 93010 ELECTROCARDIOGRAM REPORT: CPT | Performed by: INTERNAL MEDICINE

## 2020-08-17 PROCEDURE — 80048 BASIC METABOLIC PNL TOTAL CA: CPT | Performed by: NURSE PRACTITIONER

## 2020-08-17 RX ORDER — FUROSEMIDE 40 MG/1
40 TABLET ORAL DAILY
COMMUNITY
End: 2022-09-07

## 2020-08-17 RX ORDER — ACETAMINOPHEN 500 MG
1000 TABLET ORAL EVERY 6 HOURS PRN
COMMUNITY
End: 2020-08-22 | Stop reason: HOSPADM

## 2020-08-17 RX ORDER — CHLORHEXIDINE GLUCONATE 500 MG/1
1 CLOTH TOPICAL EVERY 12 HOURS PRN
Status: DISCONTINUED | OUTPATIENT
Start: 2020-08-17 | End: 2021-08-11

## 2020-08-19 ENCOUNTER — ANESTHESIA EVENT (OUTPATIENT)
Dept: PERIOP | Facility: HOSPITAL | Age: 75
End: 2020-08-19

## 2020-08-19 RX ORDER — FAMOTIDINE 10 MG/ML
20 INJECTION, SOLUTION INTRAVENOUS ONCE
Status: CANCELLED | OUTPATIENT
Start: 2020-08-19 | End: 2020-08-19

## 2020-08-20 ENCOUNTER — APPOINTMENT (OUTPATIENT)
Dept: NEUROLOGY | Facility: HOSPITAL | Age: 75
End: 2020-08-20

## 2020-08-20 ENCOUNTER — ANESTHESIA (OUTPATIENT)
Dept: PERIOP | Facility: HOSPITAL | Age: 75
End: 2020-08-20

## 2020-08-20 ENCOUNTER — HOSPITAL ENCOUNTER (INPATIENT)
Facility: HOSPITAL | Age: 75
LOS: 2 days | Discharge: HOME | End: 2020-08-22
Attending: THORACIC SURGERY (CARDIOTHORACIC VASCULAR SURGERY) | Admitting: THORACIC SURGERY (CARDIOTHORACIC VASCULAR SURGERY)

## 2020-08-20 DIAGNOSIS — I65.22 STENOSIS OF LEFT CAROTID ARTERY: Primary | Chronic | ICD-10-CM

## 2020-08-20 DIAGNOSIS — I65.22 CAROTID STENOSIS, LEFT: ICD-10-CM

## 2020-08-20 DIAGNOSIS — I65.22 STENOSIS OF LEFT CAROTID ARTERY: ICD-10-CM

## 2020-08-20 PROBLEM — I10 HYPERTENSION: Chronic | Status: ACTIVE | Noted: 2020-08-20

## 2020-08-20 PROBLEM — E03.9 HYPOTHYROIDISM: Status: ACTIVE | Noted: 2020-08-20

## 2020-08-20 PROBLEM — I10 HYPERTENSION: Status: ACTIVE | Noted: 2020-08-20

## 2020-08-20 PROBLEM — E03.9 HYPOTHYROIDISM: Chronic | Status: ACTIVE | Noted: 2020-08-20

## 2020-08-20 PROBLEM — E78.5 DYSLIPIDEMIA: Status: ACTIVE | Noted: 2020-08-20

## 2020-08-20 PROBLEM — E78.5 DYSLIPIDEMIA: Chronic | Status: ACTIVE | Noted: 2020-08-20

## 2020-08-20 PROCEDURE — 35301 RECHANNELING OF ARTERY: CPT | Performed by: PHYSICIAN ASSISTANT

## 2020-08-20 PROCEDURE — 03UL0JZ SUPPLEMENT LEFT INTERNAL CAROTID ARTERY WITH SYNTHETIC SUBSTITUTE, OPEN APPROACH: ICD-10-PCS | Performed by: THORACIC SURGERY (CARDIOTHORACIC VASCULAR SURGERY)

## 2020-08-20 PROCEDURE — 99232 SBSQ HOSP IP/OBS MODERATE 35: CPT | Performed by: INTERNAL MEDICINE

## 2020-08-20 PROCEDURE — C1768 GRAFT, VASCULAR: HCPCS | Performed by: THORACIC SURGERY (CARDIOTHORACIC VASCULAR SURGERY)

## 2020-08-20 PROCEDURE — 25010000002 VANCOMYCIN PER 500 MG: Performed by: NURSE PRACTITIONER

## 2020-08-20 PROCEDURE — 25010000002 FENTANYL CITRATE (PF) 100 MCG/2ML SOLUTION: Performed by: NURSE ANESTHETIST, CERTIFIED REGISTERED

## 2020-08-20 PROCEDURE — 25010000002 HEPARIN (PORCINE) PER 1000 UNITS: Performed by: THORACIC SURGERY (CARDIOTHORACIC VASCULAR SURGERY)

## 2020-08-20 PROCEDURE — 25010000003 LIDOCAINE 1 % SOLUTION: Performed by: THORACIC SURGERY (CARDIOTHORACIC VASCULAR SURGERY)

## 2020-08-20 PROCEDURE — 25010000002 MORPHINE PER 10 MG: Performed by: THORACIC SURGERY (CARDIOTHORACIC VASCULAR SURGERY)

## 2020-08-20 PROCEDURE — 94799 UNLISTED PULMONARY SVC/PX: CPT

## 2020-08-20 PROCEDURE — 88311 DECALCIFY TISSUE: CPT | Performed by: THORACIC SURGERY (CARDIOTHORACIC VASCULAR SURGERY)

## 2020-08-20 PROCEDURE — 35301 RECHANNELING OF ARTERY: CPT | Performed by: THORACIC SURGERY (CARDIOTHORACIC VASCULAR SURGERY)

## 2020-08-20 PROCEDURE — 88304 TISSUE EXAM BY PATHOLOGIST: CPT | Performed by: THORACIC SURGERY (CARDIOTHORACIC VASCULAR SURGERY)

## 2020-08-20 PROCEDURE — 25010000002 PHENYLEPHRINE PER 1 ML: Performed by: NURSE ANESTHETIST, CERTIFIED REGISTERED

## 2020-08-20 PROCEDURE — 95816 EEG AWAKE AND DROWSY: CPT

## 2020-08-20 PROCEDURE — 25010000002 NEOSTIGMINE 10 MG/10ML SOLUTION: Performed by: NURSE ANESTHETIST, CERTIFIED REGISTERED

## 2020-08-20 PROCEDURE — 25010000002 ONDANSETRON PER 1 MG: Performed by: NURSE ANESTHETIST, CERTIFIED REGISTERED

## 2020-08-20 PROCEDURE — 25010000003 LIDOCAINE 1 % SOLUTION: Performed by: NURSE ANESTHETIST, CERTIFIED REGISTERED

## 2020-08-20 PROCEDURE — 03CL0ZZ EXTIRPATION OF MATTER FROM LEFT INTERNAL CAROTID ARTERY, OPEN APPROACH: ICD-10-PCS | Performed by: THORACIC SURGERY (CARDIOTHORACIC VASCULAR SURGERY)

## 2020-08-20 PROCEDURE — 95955 EEG DURING SURGERY: CPT

## 2020-08-20 PROCEDURE — 25010000002 PROPOFOL 10 MG/ML EMULSION: Performed by: NURSE ANESTHETIST, CERTIFIED REGISTERED

## 2020-08-20 PROCEDURE — 25010000002 HEPARIN (PORCINE) PER 1000 UNITS: Performed by: NURSE ANESTHETIST, CERTIFIED REGISTERED

## 2020-08-20 DEVICE — VASCU-GUARD PERIPHERAL VASCULAR PATCH IS PREPARED FROM BOVINE PERICARDIUM WHICH IS CROSS-LINKED WITH GLUTARALDEHYDE. THE PERICARDIUM IS PROCURED FROM CATTLE ORIGINATING IN THE UNITED STATES. VASCU-GUARD PERIPHERAL VASCULAR PATCH IS CHEMICALLY STERILIZED USING ETHANOL AND PROPYLENE OXIDE. VASCU-GUARD PERIPHERAL VASCULAR PATCH HAS BEEN TREATED WITH 1 MOLAR SODIUM HYDROXIDE FOR A MINIMUM OF 60 MINUTES AT 20 - 25 C.  VASCU-GUARD PERIPHERAL VASCULAR PATCH IS PACKAGED IN A CONTAINER FILLED WITH STERILE, NON-PYROGENIC WATER CONTAINING PROPYLENE OXIDE. THE CONTENTS OF THE UNOPENED, UNDAMAGED CONTAINER ARE STERILE.
Type: IMPLANTABLE DEVICE | Site: CAROTID | Status: FUNCTIONAL
Brand: VASCU-GUARD PERIPHERAL VASCULAR PATCH

## 2020-08-20 RX ORDER — HYDRALAZINE HYDROCHLORIDE 50 MG/1
50 TABLET, FILM COATED ORAL 2 TIMES DAILY
Status: DISCONTINUED | OUTPATIENT
Start: 2020-08-20 | End: 2020-08-22 | Stop reason: HOSPADM

## 2020-08-20 RX ORDER — HEPARIN SODIUM 1000 [USP'U]/ML
INJECTION, SOLUTION INTRAVENOUS; SUBCUTANEOUS AS NEEDED
Status: DISCONTINUED | OUTPATIENT
Start: 2020-08-20 | End: 2020-08-20 | Stop reason: SURG

## 2020-08-20 RX ORDER — MAGNESIUM HYDROXIDE 1200 MG/15ML
LIQUID ORAL AS NEEDED
Status: DISCONTINUED | OUTPATIENT
Start: 2020-08-20 | End: 2020-08-20 | Stop reason: HOSPADM

## 2020-08-20 RX ORDER — CLOPIDOGREL BISULFATE 75 MG/1
75 TABLET ORAL DAILY
Status: DISCONTINUED | OUTPATIENT
Start: 2020-08-21 | End: 2020-08-22 | Stop reason: HOSPADM

## 2020-08-20 RX ORDER — PANTOPRAZOLE SODIUM 40 MG/1
40 TABLET, DELAYED RELEASE ORAL DAILY
Status: DISCONTINUED | OUTPATIENT
Start: 2020-08-21 | End: 2020-08-22 | Stop reason: HOSPADM

## 2020-08-20 RX ORDER — HYDROMORPHONE HYDROCHLORIDE 1 MG/ML
0.5 INJECTION, SOLUTION INTRAMUSCULAR; INTRAVENOUS; SUBCUTANEOUS
Status: DISCONTINUED | OUTPATIENT
Start: 2020-08-20 | End: 2020-08-20 | Stop reason: HOSPADM

## 2020-08-20 RX ORDER — SODIUM CHLORIDE 450 MG/100ML
30 INJECTION, SOLUTION INTRAVENOUS CONTINUOUS
Status: DISCONTINUED | OUTPATIENT
Start: 2020-08-20 | End: 2020-08-21

## 2020-08-20 RX ORDER — FENTANYL CITRATE 50 UG/ML
50 INJECTION, SOLUTION INTRAMUSCULAR; INTRAVENOUS
Status: DISCONTINUED | OUTPATIENT
Start: 2020-08-20 | End: 2020-08-20 | Stop reason: HOSPADM

## 2020-08-20 RX ORDER — LIDOCAINE HYDROCHLORIDE 10 MG/ML
INJECTION, SOLUTION INFILTRATION; PERINEURAL AS NEEDED
Status: DISCONTINUED | OUTPATIENT
Start: 2020-08-20 | End: 2020-08-20 | Stop reason: SURG

## 2020-08-20 RX ORDER — HYDROCODONE BITARTRATE AND ACETAMINOPHEN 7.5; 325 MG/1; MG/1
1 TABLET ORAL EVERY 6 HOURS PRN
Status: DISCONTINUED | OUTPATIENT
Start: 2020-08-20 | End: 2020-08-22 | Stop reason: HOSPADM

## 2020-08-20 RX ORDER — PHENYLEPHRINE HCL IN 0.9% NACL 0.5 MG/5ML
.5-3 SYRINGE (ML) INTRAVENOUS
Status: DISCONTINUED | OUTPATIENT
Start: 2020-08-20 | End: 2020-08-21

## 2020-08-20 RX ORDER — PROPOFOL 10 MG/ML
VIAL (ML) INTRAVENOUS AS NEEDED
Status: DISCONTINUED | OUTPATIENT
Start: 2020-08-20 | End: 2020-08-20 | Stop reason: SURG

## 2020-08-20 RX ORDER — ONDANSETRON 2 MG/ML
4 INJECTION INTRAMUSCULAR; INTRAVENOUS ONCE AS NEEDED
Status: DISCONTINUED | OUTPATIENT
Start: 2020-08-20 | End: 2020-08-20 | Stop reason: HOSPADM

## 2020-08-20 RX ORDER — VANCOMYCIN HYDROCHLORIDE 1 G/200ML
15 INJECTION, SOLUTION INTRAVENOUS ONCE
Status: COMPLETED | OUTPATIENT
Start: 2020-08-21 | End: 2020-08-21

## 2020-08-20 RX ORDER — ASPIRIN 81 MG/1
81 TABLET, CHEWABLE ORAL DAILY
Status: DISCONTINUED | OUTPATIENT
Start: 2020-08-21 | End: 2020-08-22 | Stop reason: HOSPADM

## 2020-08-20 RX ORDER — SODIUM CHLORIDE 0.9 % (FLUSH) 0.9 %
10 SYRINGE (ML) INJECTION AS NEEDED
Status: DISCONTINUED | OUTPATIENT
Start: 2020-08-20 | End: 2020-08-20 | Stop reason: HOSPADM

## 2020-08-20 RX ORDER — SODIUM CHLORIDE, SODIUM LACTATE, POTASSIUM CHLORIDE, CALCIUM CHLORIDE 600; 310; 30; 20 MG/100ML; MG/100ML; MG/100ML; MG/100ML
INJECTION, SOLUTION INTRAVENOUS CONTINUOUS PRN
Status: DISCONTINUED | OUTPATIENT
Start: 2020-08-20 | End: 2020-08-20 | Stop reason: SURG

## 2020-08-20 RX ORDER — SODIUM CHLORIDE 0.9 % (FLUSH) 0.9 %
3 SYRINGE (ML) INJECTION EVERY 12 HOURS SCHEDULED
Status: DISCONTINUED | OUTPATIENT
Start: 2020-08-20 | End: 2020-08-22 | Stop reason: HOSPADM

## 2020-08-20 RX ORDER — LIDOCAINE HYDROCHLORIDE 10 MG/ML
0.5 INJECTION, SOLUTION EPIDURAL; INFILTRATION; INTRACAUDAL; PERINEURAL ONCE AS NEEDED
Status: COMPLETED | OUTPATIENT
Start: 2020-08-20 | End: 2020-08-20

## 2020-08-20 RX ORDER — SODIUM CHLORIDE 0.9 % (FLUSH) 0.9 %
10 SYRINGE (ML) INJECTION AS NEEDED
Status: DISCONTINUED | OUTPATIENT
Start: 2020-08-20 | End: 2020-08-22 | Stop reason: HOSPADM

## 2020-08-20 RX ORDER — FUROSEMIDE 40 MG/1
40 TABLET ORAL DAILY
Status: DISCONTINUED | OUTPATIENT
Start: 2020-08-21 | End: 2020-08-22 | Stop reason: HOSPADM

## 2020-08-20 RX ORDER — ROCURONIUM BROMIDE 10 MG/ML
INJECTION, SOLUTION INTRAVENOUS AS NEEDED
Status: DISCONTINUED | OUTPATIENT
Start: 2020-08-20 | End: 2020-08-20 | Stop reason: SURG

## 2020-08-20 RX ORDER — GLYCOPYRROLATE 0.2 MG/ML
INJECTION INTRAMUSCULAR; INTRAVENOUS AS NEEDED
Status: DISCONTINUED | OUTPATIENT
Start: 2020-08-20 | End: 2020-08-20 | Stop reason: SURG

## 2020-08-20 RX ORDER — LEVOTHYROXINE SODIUM 0.03 MG/1
25 TABLET ORAL DAILY
Status: DISCONTINUED | OUTPATIENT
Start: 2020-08-20 | End: 2020-08-20

## 2020-08-20 RX ORDER — NEOSTIGMINE METHYLSULFATE 1 MG/ML
INJECTION, SOLUTION INTRAVENOUS AS NEEDED
Status: DISCONTINUED | OUTPATIENT
Start: 2020-08-20 | End: 2020-08-20 | Stop reason: SURG

## 2020-08-20 RX ORDER — ONDANSETRON 2 MG/ML
INJECTION INTRAMUSCULAR; INTRAVENOUS AS NEEDED
Status: DISCONTINUED | OUTPATIENT
Start: 2020-08-20 | End: 2020-08-20 | Stop reason: SURG

## 2020-08-20 RX ORDER — FENTANYL CITRATE 50 UG/ML
INJECTION, SOLUTION INTRAMUSCULAR; INTRAVENOUS AS NEEDED
Status: DISCONTINUED | OUTPATIENT
Start: 2020-08-20 | End: 2020-08-20 | Stop reason: SURG

## 2020-08-20 RX ORDER — FAMOTIDINE 20 MG/1
20 TABLET, FILM COATED ORAL ONCE
Status: COMPLETED | OUTPATIENT
Start: 2020-08-20 | End: 2020-08-20

## 2020-08-20 RX ORDER — METOPROLOL TARTRATE 50 MG/1
50 TABLET, FILM COATED ORAL EVERY 12 HOURS SCHEDULED
Status: DISCONTINUED | OUTPATIENT
Start: 2020-08-20 | End: 2020-08-22 | Stop reason: HOSPADM

## 2020-08-20 RX ORDER — METOPROLOL TARTRATE 50 MG/1
50 TABLET, FILM COATED ORAL 2 TIMES DAILY
COMMUNITY

## 2020-08-20 RX ORDER — MORPHINE SULFATE 4 MG/ML
2 INJECTION, SOLUTION INTRAMUSCULAR; INTRAVENOUS EVERY 4 HOURS PRN
Status: DISCONTINUED | OUTPATIENT
Start: 2020-08-20 | End: 2020-08-22 | Stop reason: HOSPADM

## 2020-08-20 RX ORDER — LIDOCAINE HYDROCHLORIDE 10 MG/ML
INJECTION, SOLUTION INFILTRATION; PERINEURAL AS NEEDED
Status: DISCONTINUED | OUTPATIENT
Start: 2020-08-20 | End: 2020-08-20 | Stop reason: HOSPADM

## 2020-08-20 RX ORDER — SODIUM CHLORIDE, SODIUM LACTATE, POTASSIUM CHLORIDE, CALCIUM CHLORIDE 600; 310; 30; 20 MG/100ML; MG/100ML; MG/100ML; MG/100ML
9 INJECTION, SOLUTION INTRAVENOUS CONTINUOUS
Status: DISCONTINUED | OUTPATIENT
Start: 2020-08-20 | End: 2020-08-20

## 2020-08-20 RX ORDER — SODIUM CHLORIDE 0.9 % (FLUSH) 0.9 %
10 SYRINGE (ML) INJECTION EVERY 12 HOURS SCHEDULED
Status: DISCONTINUED | OUTPATIENT
Start: 2020-08-20 | End: 2020-08-20 | Stop reason: HOSPADM

## 2020-08-20 RX ORDER — LABETALOL HYDROCHLORIDE 5 MG/ML
5 INJECTION, SOLUTION INTRAVENOUS
Status: DISCONTINUED | OUTPATIENT
Start: 2020-08-20 | End: 2020-08-20 | Stop reason: HOSPADM

## 2020-08-20 RX ORDER — IPRATROPIUM BROMIDE AND ALBUTEROL SULFATE 2.5; .5 MG/3ML; MG/3ML
3 SOLUTION RESPIRATORY (INHALATION) ONCE AS NEEDED
Status: DISCONTINUED | OUTPATIENT
Start: 2020-08-20 | End: 2020-08-20 | Stop reason: HOSPADM

## 2020-08-20 RX ORDER — PRAVASTATIN SODIUM 40 MG
40 TABLET ORAL DAILY
Status: DISCONTINUED | OUTPATIENT
Start: 2020-08-20 | End: 2020-08-22 | Stop reason: HOSPADM

## 2020-08-20 RX ORDER — HYDRALAZINE HYDROCHLORIDE 20 MG/ML
10 INJECTION INTRAMUSCULAR; INTRAVENOUS EVERY 4 HOURS PRN
Status: DISCONTINUED | OUTPATIENT
Start: 2020-08-20 | End: 2020-08-21

## 2020-08-20 RX ORDER — VANCOMYCIN HYDROCHLORIDE 1 G/200ML
15 INJECTION, SOLUTION INTRAVENOUS ONCE
Status: COMPLETED | OUTPATIENT
Start: 2020-08-20 | End: 2020-08-20

## 2020-08-20 RX ORDER — LEVOTHYROXINE SODIUM 0.03 MG/1
25 TABLET ORAL
Status: DISCONTINUED | OUTPATIENT
Start: 2020-08-21 | End: 2020-08-22 | Stop reason: HOSPADM

## 2020-08-20 RX ORDER — FENOFIBRATE 145 MG/1
145 TABLET, COATED ORAL DAILY
Status: DISCONTINUED | OUTPATIENT
Start: 2020-08-20 | End: 2020-08-22 | Stop reason: HOSPADM

## 2020-08-20 RX ADMIN — SODIUM CHLORIDE, POTASSIUM CHLORIDE, SODIUM LACTATE AND CALCIUM CHLORIDE 9 ML/HR: 600; 310; 30; 20 INJECTION, SOLUTION INTRAVENOUS at 09:50

## 2020-08-20 RX ADMIN — NICARDIPINE HYDROCHLORIDE 5 MG/HR: 0.1 INJECTION, SOLUTION INTRAVENOUS at 17:26

## 2020-08-20 RX ADMIN — PRAVASTATIN SODIUM 40 MG: 40 TABLET ORAL at 20:38

## 2020-08-20 RX ADMIN — FENTANYL CITRATE 50 MCG: 0.05 INJECTION, SOLUTION INTRAMUSCULAR; INTRAVENOUS at 15:49

## 2020-08-20 RX ADMIN — FAMOTIDINE 20 MG: 20 TABLET, FILM COATED ORAL at 10:04

## 2020-08-20 RX ADMIN — FENTANYL CITRATE 50 MCG: 0.05 INJECTION, SOLUTION INTRAMUSCULAR; INTRAVENOUS at 16:23

## 2020-08-20 RX ADMIN — ROCURONIUM BROMIDE 50 MG: 10 INJECTION INTRAVENOUS at 14:10

## 2020-08-20 RX ADMIN — PROPOFOL 150 MG: 10 INJECTION, EMULSION INTRAVENOUS at 14:10

## 2020-08-20 RX ADMIN — FENTANYL CITRATE 50 MCG: 50 INJECTION, SOLUTION INTRAMUSCULAR; INTRAVENOUS at 14:30

## 2020-08-20 RX ADMIN — LIDOCAINE HYDROCHLORIDE 0.5 ML: 10 INJECTION, SOLUTION EPIDURAL; INFILTRATION; INTRACAUDAL; PERINEURAL at 09:50

## 2020-08-20 RX ADMIN — SODIUM CHLORIDE 30 ML/HR: 4.5 INJECTION, SOLUTION INTRAVENOUS at 17:36

## 2020-08-20 RX ADMIN — PHENOL 2 SPRAY: 1.5 LIQUID ORAL at 23:41

## 2020-08-20 RX ADMIN — VANCOMYCIN HYDROCHLORIDE 1000 MG: 1 INJECTION, SOLUTION INTRAVENOUS at 13:13

## 2020-08-20 RX ADMIN — FENTANYL CITRATE 50 MCG: 50 INJECTION, SOLUTION INTRAMUSCULAR; INTRAVENOUS at 14:59

## 2020-08-20 RX ADMIN — HYDRALAZINE HYDROCHLORIDE 50 MG: 50 TABLET, FILM COATED ORAL at 20:38

## 2020-08-20 RX ADMIN — HEPARIN SODIUM 5000 UNITS: 1000 INJECTION, SOLUTION INTRAVENOUS; SUBCUTANEOUS at 14:35

## 2020-08-20 RX ADMIN — NEOSTIGMINE 3 MG: 1 INJECTION INTRAVENOUS at 15:02

## 2020-08-20 RX ADMIN — SODIUM CHLORIDE, POTASSIUM CHLORIDE, SODIUM LACTATE AND CALCIUM CHLORIDE: 600; 310; 30; 20 INJECTION, SOLUTION INTRAVENOUS at 14:03

## 2020-08-20 RX ADMIN — PHENYLEPHRINE HYDROCHLORIDE 0.5 MCG/KG/MIN: 10 INJECTION INTRAVENOUS at 14:36

## 2020-08-20 RX ADMIN — NICARDIPINE HYDROCHLORIDE 5 MG/HR: 0.1 INJECTION, SOLUTION INTRAVENOUS at 23:25

## 2020-08-20 RX ADMIN — NICARDIPINE HYDROCHLORIDE 5 MG/HR: 0.1 INJECTION, SOLUTION INTRAVENOUS at 14:59

## 2020-08-20 RX ADMIN — SODIUM CHLORIDE, PRESERVATIVE FREE 3 ML: 5 INJECTION INTRAVENOUS at 22:28

## 2020-08-20 RX ADMIN — ONDANSETRON 4 MG: 2 INJECTION INTRAMUSCULAR; INTRAVENOUS at 14:58

## 2020-08-20 RX ADMIN — METOPROLOL TARTRATE 2.5 MG: 5 INJECTION, SOLUTION INTRAVENOUS at 15:13

## 2020-08-20 RX ADMIN — METOPROLOL TARTRATE 2.5 MG: 5 INJECTION, SOLUTION INTRAVENOUS at 15:00

## 2020-08-20 RX ADMIN — GLYCOPYRROLATE 0.2 MG: 0.2 INJECTION INTRAMUSCULAR; INTRAVENOUS at 15:02

## 2020-08-20 RX ADMIN — MORPHINE SULFATE 2 MG: 4 INJECTION, SOLUTION INTRAMUSCULAR; INTRAVENOUS at 18:02

## 2020-08-20 RX ADMIN — LIDOCAINE HYDROCHLORIDE 50 MG: 10 INJECTION, SOLUTION INFILTRATION; PERINEURAL at 14:10

## 2020-08-20 NOTE — PROGRESS NOTES
Intensive Care Follow-up     Hospital:  LOS: 0 days   Ms. Nancy Healy, 75 y.o. female is followed for:   Stenosis of left carotid artery      History of present illness:   Nancy Healy is a 75 y.o. female admitted to [facility] on 8/20 for scheduled left carotid endarterectomy per Dr. Silver.     The patient has a history of HTN, dyslipidemia, and asymptomatic L ICA stenosis followed by CTS. Serial carotid imaging shows interval worsening to 95% L ICA stenosis with peak systolic/diastolic velocities of 632/147 cm/s respectively previously 2419/515 mm/s in 2019. There was also right-sided peak systolic velocities of 242 cm/s with some plaque noted at the carotid bifurcations. It was suggested to proceed with surgical intervention and she underwent pre-operative work-up.     COVID-19 testing on 8/17 negative.     Subjective   Interval History:  On 8/20 Dr. Silver performed an uncomplicated left carotid endarterectomy found to have focal near-obstructive plaque. EBL <50mL. EEG monitoring unremarkable.     Postoperatively she was transferred to the ICU in stable condition on Nicardipine for BP control.   She is currently without complaints.  Specifically, she denies visual change, headache, palpitations, or chest pain.  She is not currently short of breath.         The patient's past medical, surgical and social history were reviewed and updated in Epic as appropriate.       Objective     Infusions:    niCARdipine 5-15 mg/hr Last Rate: 5 mg/hr (08/20/20 8017)   phenylephrine 0.5-3 mcg/kg/min    sodium chloride 30 mL/hr Last Rate: 30 mL/hr (08/20/20 9124)     Medications:    I reviewed the patient's medications.    Vital Sign Min/Max for last 24 hours  Temp  Min: 97.1 °F (36.2 °C)  Max: 97.5 °F (36.4 °C)   BP  Min: 122/62  Max: 204/100   Pulse  Min: 50  Max: 76   Resp  Min: 16  Max: 16   SpO2  Min: 94 %  Max: 98 %   Flow (L/min)  Min: 2  Max: 3       Input/Output for last 24 hour shift  No intake/output data  recorded.      In general this is a well-developed woman in no acute distress.  She is alert and oriented x3.  Neck examination shows a supple neck.  The left side of her neck is dressed and dry.  HEENT shows pupils equal round and reactive light, EOMI, oropharynx is clear.  No facial droop.  Lungs show clear breath sounds bilaterally with no wheezes, rales, or rhonchi.  Chest wall is atraumatic and nontender.  Abdomen is soft, nontender, nondistended with normal bowel sounds  Heart exam shows normal S1 and S2 with no murmurs, rubs, or gallops.  Extremities show no cyanosis, clubbing, or edema.  She is moving all 4 extremities equally.   strengths are equal.    Results from last 7 days   Lab Units 08/17/20  1012   WBC 10*3/mm3 9.77   HEMOGLOBIN g/dL 14.9   PLATELETS 10*3/mm3 308     Results from last 7 days   Lab Units 08/17/20  1012   SODIUM mmol/L 142   POTASSIUM mmol/L 4.1   CO2 mmol/L 25.0   BUN mg/dL 19   CREATININE mg/dL 0.92   GLUCOSE mg/dL 112*     Estimated Creatinine Clearance: 47 mL/min (by C-G formula based on SCr of 0.92 mg/dL).      Assessment/Plan   Impression        >95% L ICA stenosis     Hypertension    Dyslipidemia on statin     Hypothyroidism on Rx     Carotid stenosis, left       Plan        Patient be monitored very closely in the intensive care unit.  Close neurovascular checks.  Blood pressure control with IV Cardene as needed.  Mobilize when surgically appropriate.       I discussed the patient's findings and my recommendations with patient       Parvin BASILIO Diane, APRN, AGACNP-BC, FNP-BC   Pulmonary and Critical Care     I have reviewed the above documentation with Ms. Contreras and I have completed my own physical examination and review the data.  I have modified the above documentation including the history of present illness, physical examination, and impression/plan to reflect my findings.    David Penaloza MD

## 2020-08-20 NOTE — ANESTHESIA POSTPROCEDURE EVALUATION
Patient: Nancy Healy    Procedure Summary     Date:  08/20/20 Room / Location:   [facility] [address] [place] [address] [facility]    Anesthesia Start:  1403 Anesthesia Stop:  1530    Procedure:  CAROTID ENDARTERECTOMY WITH EEG LEFT (Left Neck) Diagnosis:       Stenosis of left carotid artery      (Stenosis of left carotid artery [I65.22])    Surgeon:  Denver Silver MD Provider:  Lucas Foster MD    Anesthesia Type:  general ASA Status:  3          Anesthesia Type: general    Vitals  Vitals Value Taken Time   /62 8/20/2020  3:30 PM   Temp 97.1 °F (36.2 °C) 8/20/2020  3:30 PM   Pulse 76 8/20/2020  3:30 PM   Resp 16 8/20/2020  3:30 PM   SpO2 97 % 8/20/2020  3:30 PM           Post Anesthesia Care and Evaluation    Patient location during evaluation: PACU  Patient participation: complete - patient participated  Level of consciousness: awake and alert  Pain management: adequate  Airway patency: patent  Anesthetic complications: No anesthetic complications  PONV Status: none  Cardiovascular status: acceptable  Respiratory status: acceptable  Hydration status: acceptable

## 2020-08-20 NOTE — INTERVAL H&P NOTE
"Pre-Op H&P (See Recent Office Note Attached for Full H&P)    Review of Systems:  General ROS:  no fever, chills, rashes.  No change since last office visit.  No recent sick exposure  Cardiovascular ROS: no chest pain or dyspnea on exertion.  Last stress test > 10 years ago with [facility] with nonobstructive CAD at that time.  Home BP's 160's-170's  Respiratory ROS: no cough, shortness of breath, or wheezing  Neuro:  No new TIA/CVA symptoms    Meds:    No current facility-administered medications on file prior to encounter.      Current Outpatient Medications on File Prior to Encounter   Medication Sig Dispense Refill   • alendronate (FOSAMAX) 70 MG tablet Take 70 mg by mouth Every 7 (Seven) Days. Sunday     • Cyanocobalamin (B-12) 1000 MCG capsule Take 500 mcg by mouth Daily.     • fenofibrate 160 MG tablet Take 160 mg by mouth Daily.     • hydrALAZINE (APRESOLINE) 50 MG tablet Take 50 mg by mouth 2 (two) times a day.     • levothyroxine (SYNTHROID, LEVOTHROID) 25 MCG tablet Take 25 mcg by mouth Daily.     • metoprolol tartrate (LOPRESSOR) 50 MG tablet Take 50 mg by mouth 2 (Two) Times a Day.     • OMEGA-3 KRILL OIL PO Take 1 capsule by mouth Daily.     • pantoprazole (PROTONIX) 40 MG EC tablet Take 40 mg by mouth Daily.     • pravastatin (PRAVACHOL) 40 MG tablet Take 40 mg by mouth Daily.     • terconazole (TERAZOL 7) 0.4 % vaginal cream Insert 1 applicator into the vagina Daily As Needed for Irritation.     • TURMERIC CURCUMIN PO Take 1 capsule by mouth 2 (two) times a day.     • aspirin 81 MG chewable tablet Chew 81 mg Daily.     • [DISCONTINUED] metoprolol tartrate (LOPRESSOR) 50 MG tablet Take 50 mg by mouth 2 (Two) Times a Day.         Vital Signs:  BP (!) 204/100 (BP Location: Right arm, Patient Position: Lying)   Pulse 61   Temp 97.5 °F (36.4 °C) (Temporal)   Resp 16   Ht 157.5 cm (62\")   Wt 65.8 kg (145 lb)   LMP  (LMP Unknown)   SpO2 98%   BMI 26.52 kg/m²   Anesthesia to address BP  Physical Exam:    CV: "  S1S2 regular rate and rhythm, no murmur               Resp:  Clear to auscultation; respirations regular, even and unlabored    Results Review:     Lab Results   Component Value Date    WBC 9.77 08/17/2020    HGB 14.9 08/17/2020    HCT 46.3 08/17/2020    MCV 91.1 08/17/2020     08/17/2020    NEUTROABS 6.74 (H) 03/21/2018    GLUCOSE 112 (H) 08/17/2020    BUN 19 08/17/2020    CREATININE 0.92 08/17/2020    EGFRIFNONA 60 (L) 08/17/2020     08/17/2020    K 4.1 08/17/2020     08/17/2020    CO2 25.0 08/17/2020    CALCIUM 10.0 08/17/2020    ALBUMIN 4.40 03/21/2018    AST 24 03/21/2018    ALT 22 03/21/2018    BILITOT 0.4 03/21/2018        I reviewed the patient's new clinical results.    Cancer Staging (if applicable)  Cancer Patient: __ yes _x_no __unknown; If yes, clinical stage T:__ N:__M:__, stage group or __N/A    Assessment/Plan:    Ms. Nancy Healy is a pleasant 75 y.o. female with a history of hypertension, hyperlipidemia and left carotid artery stenosis  who presented to office for one-year follow-up for review and discussion of recent imaging.  The patient CTA of her carotids was discussed with her in the office which demonstrated an approximately 95% critical left internal carotid artery stenosis.  I discussed the need for a left carotid endarterectomy with the patient in the office .  I discussed risk and benefits of this procedure including the risk of pain, bleeding, infection, difficulty speaking, hoarseness, stroke, myocardial infarction and death.  Patient verbalized understanding's and wishes to proceed with a left carotid endarterectomy.      Of note, pt with history of +stress test with nonobstructive CAD on [facility] >10 years ago with new critical LICA stenosis and uncontrolled BP.  Pt currently denies any anginal symptoms with no ischemic changes on EKG.  Advised pt to follow up with PCP following recovery for repeat cardiac ischemic evaluation.  She verbalizes  understanding.    Lisa Frost, APRN  8/20/2020   09:46

## 2020-08-20 NOTE — ANESTHESIA PROCEDURE NOTES
Airway  Urgency: elective    Date/Time: 8/20/2020 2:11 PM  Airway not difficult    General Information and Staff    Patient location during procedure: OR  CRNA: Dalia Toribio CRNA    Indications and Patient Condition  Indications for airway management: airway protection    Preoxygenated: yes  MILS not maintained throughout  Mask difficulty assessment: 1 - vent by mask    Final Airway Details  Final airway type: endotracheal airway      Successful airway: ETT  Cuffed: yes   Successful intubation technique: direct laryngoscopy  Facilitating devices/methods: intubating stylet  Endotracheal tube insertion site: oral  Blade: [place]  Blade size: 3  ETT size (mm): 7.0  Cormack-Lehane Classification: grade I - full view of glottis  Placement verified by: chest auscultation and capnometry   Measured from: lips  ETT/EBT  to lips (cm): 20  Number of attempts at approach: 1  Assessment: lips, teeth, and gum same as pre-op and atraumatic intubation    Additional Comments  Negative epigastric sounds, Breath sound equal bilaterally with symmetric chest rise and fall

## 2020-08-20 NOTE — ANESTHESIA PREPROCEDURE EVALUATION
Anesthesia Evaluation     Patient summary reviewed and Nursing notes reviewed   history of anesthetic complications (SPINAL HEADACHE):               Airway   Mallampati: I  TM distance: >3 FB  Neck ROM: full  No difficulty expected  Dental - normal exam     Pulmonary - negative pulmonary ROS and normal exam   Cardiovascular - normal exam    (+) hypertension, hyperlipidemia,  carotid artery disease      Neuro/Psych  (+) headaches,     GI/Hepatic/Renal/Endo    (+)  GERD,  thyroid problem hypothyroidism    Musculoskeletal     Abdominal  - normal exam    Bowel sounds: normal.   Substance History - negative use     OB/GYN negative ob/gyn ROS         Other   arthritis,    history of cancer (BASAL CELL CA)                    Anesthesia Plan    ASA 3     general   (BRAXTON)  intravenous induction     Anesthetic plan, all risks, benefits, and alternatives have been provided, discussed and informed consent has been obtained with: patient.    Plan discussed with CRNA.

## 2020-08-20 NOTE — ANESTHESIA PROCEDURE NOTES
Arterial Line      Patient reassessed immediately prior to procedure    Patient location during procedure: pre-op   Line placed for hemodynamic monitoring.  Preanesthetic Checklist  Completed: patient identified, site marked, surgical consent, pre-op evaluation, timeout performed, IV checked, risks and benefits discussed and monitors and equipment checked  Arterial Line Prep   Sterile Tech: cap, gloves and sterile barriers  Prep: ChloraPrep  Patient monitoring: blood pressure monitoring, continuous pulse oximetry and EKG  Arterial Line Procedure   Laterality:right  Location:  radial artery  Catheter size: 20 G   Guidance: palpation technique  Number of attempts: 1  Successful placement: yes  Post Assessment   Dressing Type: line sutured, occlusive dressing applied, secured with tape and wrist guard applied.   Complications no  Circ/Move/Sens Assessment: normal and unchanged.   Patient Tolerance: patient tolerated the procedure well with no apparent complications

## 2020-08-20 NOTE — PLAN OF CARE
Problem: Patient Care Overview  Goal: Plan of Care Review  8/20/2020 1841 by Esha Teixeira RN  Outcome: Ongoing (interventions implemented as appropriate)  Flowsheets (Taken 8/20/2020 1841)  Progress: no change  Plan of Care Reviewed With: patient; spouse  Outcome Summary: Patient arrived to unit from PACU at 1705, moves all extremities equally, no numbness/tingling, no visual deficit, no facial droop, tongue is midline. CEA site is closed with dermabond, no drainage present. 5 ml out of JERRY drain. 1 dose of PRN morphine administered for pain. Passed bedside dysphagia eval. Home Synthroid ordered d/t inability to take generic brand.  at bedside and updated on plan of care. Cardene gtt utilized to maintain SBP <140. Temperature upon arrival 96.4F, room temperature increased and warm blankets applied. Stroke book and education provided to patient and spouse.   Problem: Pain, Chronic (Adult)  Goal: Identify Related Risk Factors and Signs and Symptoms  8/20/2020 1841 by Esha Teixeira RN  Outcome: Ongoing (interventions implemented as appropriate)     Problem: Pain, Chronic (Adult)  Goal: Acceptable Pain/Comfort Level and Functional Ability  8/20/2020 1841 by Esha Teixeira RN  Outcome: Ongoing (interventions implemented as appropriate)  8/20/2020 1840 by Esha Teixeira RN  Outcome: Ongoing (interventions implemented as appropriate)     Problem: Skin Injury Risk (Adult)  Goal: Identify Related Risk Factors and Signs and Symptoms  8/20/2020 1841 by Esha Teixeira RN  Outcome: Ongoing (interventions implemented as appropriate)  8/20/2020 1840 by Esha Teixeira RN  Outcome: Ongoing (interventions implemented as appropriate)     Problem: Skin Injury Risk (Adult)  Goal: Skin Health and Integrity  8/20/2020 1841 by Esha Teixeira RN  Outcome: Ongoing (interventions implemented as appropriate)  8/20/2020 1840 by Esha Teixeira RN  Outcome: Ongoing (interventions implemented as  appropriate)     Problem: Carotid Endarterectomy (Adult)  Goal: Signs and Symptoms of Listed Potential Problems Will be Absent, Minimized or Managed (Carotid Endarterectomy)  8/20/2020 1841 by Esha Teixeira RN  Outcome: Ongoing (interventions implemented as appropriate)  8/20/2020 1840 by Esha Teixeira RN  Outcome: Ongoing (interventions implemented as appropriate)     Problem: Carotid Endarterectomy (Adult)  Goal: Anesthesia/Sedation Recovery  8/20/2020 1841 by Esha Teixeira RN  Outcome: Ongoing (interventions implemented as appropriate)  8/20/2020 1840 by Esha Teixeira RN  Outcome: Ongoing (interventions implemented as appropriate)     8/20/2020 1840 by Esha Teixeira RN  Outcome: Ongoing (interventions implemented as appropriate)     Problem: Patient Care Overview  Goal: Individualization and Mutuality  8/20/2020 1841 by Esha Teixeira RN  Outcome: Ongoing (interventions implemented as appropriate)  8/20/2020 1840 by Esha Teixeira RN  Outcome: Ongoing (interventions implemented as appropriate)     Problem: Patient Care Overview  Goal: Discharge Needs Assessment  8/20/2020 1841 by Esha Teixeira RN  Outcome: Ongoing (interventions implemented as appropriate)  8/20/2020 1840 by Esha Teixeira RN  Outcome: Ongoing (interventions implemented as appropriate)     Problem: Patient Care Overview  Goal: Interprofessional Rounds/Family Conf  8/20/2020 1841 by Esha Teixeira RN  Outcome: Ongoing (interventions implemented as appropriate)  8/20/2020 1840 by Esha Teixeira RN  Outcome: Ongoing (interventions implemented as appropriate)

## 2020-08-20 NOTE — BRIEF OP NOTE
CAROTID ENDARTERECTOMY WITH EEG  Progress Note    Nancy Healy  8/20/2020    Pre-op Diagnosis:   Stenosis of left carotid artery [I65.22]       Post-Op Diagnosis Codes:     * Stenosis of left carotid artery [I65.22]    Procedure/CPT® Codes:    Procedure(s):  CAROTID ENDARTERECTOMY WITH EEG LEFT    Surgeon(s):  Denver Silver MD    Anesthesia: General    Staff:   Circulator: Velma Wagner RN; Poornima Sanchez RN  Scrub Person: Hugo Choi  Nursing Assistant: Saranya Robles  Assistant: Jose Mckinney PA  Assistant: Jose Mckinney PA      Estimated Blood Loss: < 50 mL    Urine Voided: * No values recorded between 8/20/2020  2:03 PM and 8/20/2020  3:12 PM *    Specimens:                Specimens     ID Source Type Tests Collected By Collected At Frozen?      1 Carotid Plaque Surgical Site · WOUND CULTURE   Denver Silver MD 8/20/20 1443      Description: LEFT     Comment: LEFT     This specimen was not marked as sent.                Drains:   Closed/Suction Drain 1 Left Neck Bulb 15 Fr. (Active)       Findings: Focal near obstructive plaque    Complications: None    Assistant: Jose Mckinney PA  was responsible for performing the following activities: Retraction, Suction, Closing and Placing Dressing and their skilled assistance was necessary for the success of this case.    Denver Silver MD     Date: 8/20/2020  Time: 15:12

## 2020-08-21 LAB
ANION GAP SERPL CALCULATED.3IONS-SCNC: 10 MMOL/L (ref 5–15)
BASOPHILS # BLD MANUAL: 0 10*3/MM3 (ref 0–0.2)
BASOPHILS NFR BLD AUTO: 0 % (ref 0–1.5)
BUN SERPL-MCNC: 13 MG/DL (ref 8–23)
BUN/CREAT SERPL: 16 (ref 7–25)
CALCIUM SPEC-SCNC: 8.4 MG/DL (ref 8.6–10.5)
CHLORIDE SERPL-SCNC: 103 MMOL/L (ref 98–107)
CO2 SERPL-SCNC: 22 MMOL/L (ref 22–29)
CREAT SERPL-MCNC: 0.81 MG/DL (ref 0.57–1)
DEPRECATED RDW RBC AUTO: 42.5 FL (ref 37–54)
EOSINOPHIL # BLD MANUAL: 0 10*3/MM3 (ref 0–0.4)
EOSINOPHIL NFR BLD MANUAL: 0 % (ref 0.3–6.2)
ERYTHROCYTE [DISTWIDTH] IN BLOOD BY AUTOMATED COUNT: 12.8 % (ref 12.3–15.4)
GFR SERPL CREATININE-BSD FRML MDRD: 69 ML/MIN/1.73
GLUCOSE SERPL-MCNC: 160 MG/DL (ref 65–99)
HCT VFR BLD AUTO: 41 % (ref 34–46.6)
HGB BLD-MCNC: 13 G/DL (ref 12–15.9)
LYMPHOCYTES # BLD MANUAL: 0.74 10*3/MM3 (ref 0.7–3.1)
LYMPHOCYTES NFR BLD MANUAL: 2 % (ref 5–12)
LYMPHOCYTES NFR BLD MANUAL: 6 % (ref 19.6–45.3)
MCH RBC QN AUTO: 28.6 PG (ref 26.6–33)
MCHC RBC AUTO-ENTMCNC: 31.7 G/DL (ref 31.5–35.7)
MCV RBC AUTO: 90.3 FL (ref 79–97)
MONOCYTES # BLD AUTO: 0.25 10*3/MM3 (ref 0.1–0.9)
NEUTROPHILS # BLD AUTO: 11.35 10*3/MM3 (ref 1.7–7)
NEUTROPHILS NFR BLD MANUAL: 92 % (ref 42.7–76)
PLAT MORPH BLD: NORMAL
PLATELET # BLD AUTO: 257 10*3/MM3 (ref 140–450)
PMV BLD AUTO: 10 FL (ref 6–12)
POTASSIUM SERPL-SCNC: 3.8 MMOL/L (ref 3.5–5.2)
RBC # BLD AUTO: 4.54 10*6/MM3 (ref 3.77–5.28)
RBC MORPH BLD: NORMAL
SCAN SLIDE: NORMAL
SODIUM SERPL-SCNC: 135 MMOL/L (ref 136–145)
WBC # BLD AUTO: 12.34 10*3/MM3 (ref 3.4–10.8)
WBC MORPH BLD: NORMAL

## 2020-08-21 PROCEDURE — 85007 BL SMEAR W/DIFF WBC COUNT: CPT | Performed by: PHYSICIAN ASSISTANT

## 2020-08-21 PROCEDURE — 99232 SBSQ HOSP IP/OBS MODERATE 35: CPT | Performed by: INTERNAL MEDICINE

## 2020-08-21 PROCEDURE — 85025 COMPLETE CBC W/AUTO DIFF WBC: CPT | Performed by: PHYSICIAN ASSISTANT

## 2020-08-21 PROCEDURE — 99024 POSTOP FOLLOW-UP VISIT: CPT | Performed by: PHYSICIAN ASSISTANT

## 2020-08-21 PROCEDURE — 80048 BASIC METABOLIC PNL TOTAL CA: CPT | Performed by: PHYSICIAN ASSISTANT

## 2020-08-21 PROCEDURE — 25010000002 MORPHINE PER 10 MG: Performed by: THORACIC SURGERY (CARDIOTHORACIC VASCULAR SURGERY)

## 2020-08-21 PROCEDURE — 25010000002 VANCOMYCIN PER 500 MG: Performed by: PHYSICIAN ASSISTANT

## 2020-08-21 RX ORDER — HYDROCODONE BITARTRATE AND ACETAMINOPHEN 7.5; 325 MG/1; MG/1
1 TABLET ORAL EVERY 6 HOURS PRN
Start: 2020-08-21 | End: 2020-08-22 | Stop reason: HOSPADM

## 2020-08-21 RX ORDER — CLOPIDOGREL BISULFATE 75 MG/1
75 TABLET ORAL DAILY
Qty: 30 TABLET | Refills: 2 | Status: SHIPPED | OUTPATIENT
Start: 2020-08-21 | End: 2020-09-09 | Stop reason: SDUPTHER

## 2020-08-21 RX ORDER — FLUTICASONE PROPIONATE 50 MCG
2 SPRAY, SUSPENSION (ML) NASAL DAILY
Status: DISCONTINUED | OUTPATIENT
Start: 2020-08-21 | End: 2020-08-22 | Stop reason: HOSPADM

## 2020-08-21 RX ORDER — AMLODIPINE BESYLATE 10 MG/1
10 TABLET ORAL
Status: DISCONTINUED | OUTPATIENT
Start: 2020-08-21 | End: 2020-08-22 | Stop reason: HOSPADM

## 2020-08-21 RX ORDER — CETIRIZINE HYDROCHLORIDE 10 MG/1
10 TABLET ORAL DAILY
Status: DISCONTINUED | OUTPATIENT
Start: 2020-08-21 | End: 2020-08-22 | Stop reason: HOSPADM

## 2020-08-21 RX ADMIN — LEVOTHYROXINE SODIUM 25 MCG: 25 TABLET ORAL at 05:34

## 2020-08-21 RX ADMIN — NICARDIPINE HYDROCHLORIDE 5 MG/HR: 0.1 INJECTION, SOLUTION INTRAVENOUS at 08:35

## 2020-08-21 RX ADMIN — AMLODIPINE BESYLATE 10 MG: 10 TABLET ORAL at 09:53

## 2020-08-21 RX ADMIN — PANTOPRAZOLE SODIUM 40 MG: 40 TABLET, DELAYED RELEASE ORAL at 05:34

## 2020-08-21 RX ADMIN — ASPIRIN 81 MG: 81 TABLET, CHEWABLE ORAL at 08:27

## 2020-08-21 RX ADMIN — SODIUM CHLORIDE, PRESERVATIVE FREE 3 ML: 5 INJECTION INTRAVENOUS at 08:29

## 2020-08-21 RX ADMIN — VANCOMYCIN HYDROCHLORIDE 1000 MG: 1 INJECTION, SOLUTION INTRAVENOUS at 00:52

## 2020-08-21 RX ADMIN — PRAVASTATIN SODIUM 40 MG: 40 TABLET ORAL at 22:56

## 2020-08-21 RX ADMIN — SODIUM CHLORIDE, PRESERVATIVE FREE 3 ML: 5 INJECTION INTRAVENOUS at 22:56

## 2020-08-21 RX ADMIN — NICARDIPINE HYDROCHLORIDE 5 MG/HR: 0.1 INJECTION, SOLUTION INTRAVENOUS at 04:10

## 2020-08-21 RX ADMIN — FLUTICASONE PROPIONATE 2 SPRAY: 50 SPRAY, METERED NASAL at 14:10

## 2020-08-21 RX ADMIN — CLOPIDOGREL BISULFATE 75 MG: 75 TABLET ORAL at 07:28

## 2020-08-21 RX ADMIN — FENOFIBRATE 145 MG: 145 TABLET ORAL at 07:29

## 2020-08-21 RX ADMIN — CETIRIZINE HYDROCHLORIDE 10 MG: 10 TABLET, FILM COATED ORAL at 14:10

## 2020-08-21 RX ADMIN — METOPROLOL TARTRATE 50 MG: 50 TABLET, FILM COATED ORAL at 22:55

## 2020-08-21 RX ADMIN — HYDRALAZINE HYDROCHLORIDE 50 MG: 50 TABLET, FILM COATED ORAL at 07:26

## 2020-08-21 RX ADMIN — METOPROLOL TARTRATE 50 MG: 50 TABLET, FILM COATED ORAL at 07:28

## 2020-08-21 RX ADMIN — HYDRALAZINE HYDROCHLORIDE 50 MG: 50 TABLET, FILM COATED ORAL at 22:56

## 2020-08-21 RX ADMIN — FUROSEMIDE 40 MG: 40 TABLET ORAL at 07:27

## 2020-08-21 NOTE — PROGRESS NOTES
Discharge Planning Assessment  [facility]     Patient Name: Nancy Healy  MRN: [id number]  Today's Date: 8/21/2020    Admit Date: 8/20/2020    Discharge Needs Assessment     Row Name 08/21/20 1138       Living Environment    Lives With  spouse    Current Living Arrangements  home/apartment/condo    Primary Care Provided by  self    Family Caregiver if Needed  spouse    Able to Return to Prior Arrangements  yes       Resource/Environmental Concerns    Resource/Environmental Concerns  none       Transition Planning    Patient/Family Anticipates Transition to  home    Transportation Anticipated  family or friend will provide       Discharge Needs Assessment    Concerns to be Addressed  no discharge needs identified    Equipment Currently Used at Home  none    Equipment Needed After Discharge  none        Discharge Plan     Row Name 08/21/20 1138       Plan    Plan  Home    Patient/Family in Agreement with Plan  yes    Plan Comments  I met with Mrs. Healy at bedside to discuss discharge planning.  She lives in [place] [facility] [place] with her spouse.  Independent with ADL's.  Has no DME/HH/PT.  has RX coverage and gets meds filled at [facility].  No d/c needs identified    to transport home.      Final Discharge Disposition Code  01 - home or self-care        Destination      Coordination has not been started for this encounter.      Durable Medical Equipment      Coordination has not been started for this encounter.      Dialysis/Infusion      Coordination has not been started for this encounter.      Home Medical Care      Coordination has not been started for this encounter.      Therapy      Coordination has not been started for this encounter.      Community Resources      Coordination has not been started for this encounter.        Expected Discharge Date and Time     Expected Discharge Date Expected Discharge Time    Aug 21, 2020         Demographic Summary     Row Name 08/21/20 1136       General  Information    Admission Type  inpatient    General Information Comments  PCP - Eliel Barcenas        Functional Status     Row Name 08/21/20 1134       Functional Status    Usual Activity Tolerance  good    Current Activity Tolerance  moderate       Functional Status, IADL    Medications  independent    Meal Preparation  independent    Housekeeping  independent    Laundry  independent    Shopping  independent        Psychosocial    No documentation.       Abuse/Neglect    No documentation.       Legal    No documentation.       Substance Abuse    No documentation.       Patient Forms    No documentation.           Alma Hernandez RN

## 2020-08-21 NOTE — PROGRESS NOTES
Case Management Discharge Note      Final Note:  No d/c needs identified    to transport home.           Destination      No service has been selected for the patient.      Durable Medical Equipment      No service has been selected for the patient.      Dialysis/Infusion      No service has been selected for the patient.      Home Medical Care      No service has been selected for the patient.      Therapy      No service has been selected for the patient.      Community Resources      No service has been selected for the patient.             Final Discharge Disposition Code: 01 - home or self-care

## 2020-08-21 NOTE — PLAN OF CARE
Took over care of patient at 1545. VSS, RA. Patient A/Ox4 and pleasant. Patient denies pain. States dizziness is getting better. No other complaints at this time.

## 2020-08-21 NOTE — NURSING NOTE
Patient has experienced consistent dizziness today - talked with Marcelo VALADEZ today who notified Adrianne GRANADO. Discharge cancelled and patient to be transferred to telemetry unit today instead.

## 2020-08-21 NOTE — PROGRESS NOTES
Pulmonary/Critical Care Follow-up     LOS: 1 day   Patient Care Team:  Eliel Barcenas MD as PCP - General  Eliel Barcenas MD as PCP - Family Medicine  [facility]Flash rodriguez MD as Consulting Physician (Nephrology)    Chief Complaint: carotid stenosis / post-operative management of medical issues including hypothyroidism, glycemic control, and electrolyte management.        Subjective      Initial history (on arrival to the intensive care unit 8/20/2020):    Nancy Healy is a 75 y.o. female admitted to [facility] on 8/20 for scheduled left carotid endarterectomy per Dr. Silver.      The patient has a history of HTN, dyslipidemia, and asymptomatic L ICA stenosis followed by CTS. Serial carotid imaging shows interval worsening to 95% L ICA stenosis with peak systolic/diastolic velocities of 632/147 cm/s respectively previously 2419/515 mm/s in 2019. There was also right-sided peak systolic velocities of 242 cm/s with some plaque noted at the carotid bifurcations. It was suggested to proceed with surgical intervention and she underwent pre-operative work-up.      COVID-19 testing on 8/17 negative.     (End of copied text).    Interval History:     Patient has been having significant hypertension.  Review of prior blood pressures show that she was 200 systolic on presentation to the hospital as well as over 200 systolic at her visit with Dr. Silver a month or so ago.  Her blood pressure medicines have not been changed since that time.    Currently the patient reports dizziness.  She denies lightheadedness but specifically states she has dizziness.  She apparently has been a bit unsteady on her feet when nurses have assisted her with ambulating.    She does state that she has had left ear pain for at least a year and feels like her ear is congested.  She reports allergy symptoms and occasionally takes loratadine at home.    History taken from: Patient    PMH/FH/Social History were reviewed and updated appropriately  in the electronic medical record.     Review of Systems:    Review of 14 systems was completed with positives and pertinent negatives noted in the subjective section.  All other systems reviewed and are negative.         Objective     Vital Signs  Temp:  [96.4 °F (35.8 °C)-97.5 °F (36.4 °C)] 97.5 °F (36.4 °C)  Heart Rate:  [48-79] 71  Resp:  [16-17] 16  BP: (118-204)/() 146/62  Arterial Line BP: ()/() 276/273  08/20 0701 - 08/21 0700  In: 2150.6 [I.V.:2150.6]  Out: 765 [Urine:725; Drains:20]  Body mass index is 28.59 kg/m².     IV drips:    niCARdipine Last Rate: 5 mg/hr (08/21/20 0835)   phenylephrine    sodium chloride Last Rate: 30 mL/hr (08/20/20 1736)      Physical Exam:     Constitutional:   Alert, cooperative, in no acute distress   Head:   Normocephalic, without obvious abnormality, atraumatic   Eyes:           Lids and lashes normal, conjunctivae and sclerae normal.  No icterus, no pallor, corneas clear, PER   ENMT:  Ears appear intact with no abnormalities noted     No oral lesions, no thrush, oral mucosa moist, both tympanic membranes are opaque, the left one is nonerythematous but does have some fluid, the right is retracted a bit.   Neck:  No adenopathy, supple, trachea midline, no thyromegaly, left neck dressing in place.   Lungs/Resp:    Normal effort, symmetric chest rise, no crepitus, clear to      auscultation bilaterally, no chest wall tenderness                Heart/CV:   Regular rhythm and normal rate, normal S1 and S2, no            murmur   Abdomen/GI:    Normal bowel sounds, no masses, no organomegaly, soft        non-tender, non-distended   :    Deferred   Extremities/MSK:  No clubbing or cyanosis.  No edema.  Normal tone.    No deformities.    Pulses:  Pulses palpable and equal bilaterally   Skin:  No bleeding, bruising or rash   Heme/Lymph:  No cervical or supraclavicular adenopathy.   Neurologic:      Psychiatric:    Moves all extremities with no obvious focal motor  deficit.  Cranial nerves 2 - 12 grossly intact, coordination is intact, no nystagmus.  Oriented x 3, normal affect.    The above physical exam findings were reviewed and reflect my exam findings as of today's exam.   Jama Madera MD 08/21/20 16:05      Results Review:     I reviewed the patient's new clinical results.   Results from last 7 days   Lab Units 08/21/20  0348 08/17/20  1012   SODIUM mmol/L 135* 142   POTASSIUM mmol/L 3.8 4.1   CHLORIDE mmol/L 103 105   CO2 mmol/L 22.0 25.0   BUN mg/dL 13 19   CREATININE mg/dL 0.81 0.92   CALCIUM mg/dL 8.4* 10.0   GLUCOSE mg/dL 160* 112*     Results from last 7 days   Lab Units 08/21/20  0348 08/17/20  1012   WBC 10*3/mm3 12.34* 9.77   HEMOGLOBIN g/dL 13.0 14.9   HEMATOCRIT % 41.0 46.3   PLATELETS 10*3/mm3 257 308   MONOCYTES % % 2.0*  --      Results from last 7 days   Lab Units 08/17/20  1012   HEMOGLOBIN A1C % 5.60               I reviewed the patient's new imaging including images and reports.    Medication Review:     levothyroxine 25 mcg Oral Q AM   aspirin 81 mg Oral Daily   clopidogrel 75 mg Oral Daily   fenofibrate 145 mg Oral Daily   furosemide 40 mg Oral Daily   hydrALAZINE 50 mg Oral BID   metoprolol tartrate 50 mg Oral Q12H   pantoprazole 40 mg Oral Daily   pravastatin 40 mg Oral Daily   sodium chloride 3 mL Intravenous Q12H       niCARdipine 5-15 mg/hr Last Rate: 5 mg/hr (08/21/20 0835)   phenylephrine 0.5-3 mcg/kg/min    sodium chloride 30 mL/hr Last Rate: 30 mL/hr (08/20/20 1736)       Assessment/Plan         >95% L ICA stenosis     Hypertension    Dyslipidemia on statin     Hypothyroidism on Rx     Carotid stenosis, left    75 y.o. female who underwent left carotid endarterectomy by Dr. Silver on 8/20/2020.  She has had significant issues with hypertension and review of her records show she is significantly hypertensive as an outpatient.    Plan:  1.  Add amlodipine 10 mg daily.  2.  Continue hydralazine, metoprolol, and Lasix.  3.  Add nasal  steroid and Zyrtec for allergic rhinitis.  4.  Continue statin, fenofibrate, aspirin, Plavix.  5.  Additional evaluation and decision for discharge per primary service.    Jama Madera MD  08/21/20  08:38      *. Please note that portions of this note were completed with [employer] - a voice recognition program.

## 2020-08-21 NOTE — PROGRESS NOTES
CTS Progress Note      POD #1 s/p Left carotid endarterectomy with patch angioplasty    Chief Complaint: Minimal dizziness    Subjective  Patient with mild dizziness this a.m., otherwise is doing well without complaints.    Objective    Physical Exam:   Vital Signs   Temp:  [96.4 °F (35.8 °C)-97.7 °F (36.5 °C)] 97.7 °F (36.5 °C)  Heart Rate:  [48-84] 76  Resp:  [16-18] 18  BP: (118-164)/(53-89) 134/54  Arterial Line BP: ()/() 276/273   GEN: NAD   RESP: Respirations even and unlabored and clear to auscultation bilaterally no wheezes, rales or rhonchi    CV: Regular rate and rhythm no murmurs, rubs or gallops   ABD: Soft, nontender/nondistended with normoactive bowel sounds    EXT: Warm good color perfused no bilateral lower extremity edema   INT: Incision c/d/i   Neuro: Alert oriented x3, no motor or sensory deficits.  Tongue is midline.  Voice hoarse   JERRY Output: 20 mL / 20 hours    Intake/Output Summary (Last 24 hours) at 8/21/2020 1010  Last data filed at 8/21/2020 0800  Gross per 24 hour   Intake 2826.1 ml   Output 765 ml   Net 2061.1 ml     Results     Results from last 7 days   Lab Units 08/21/20  0348   WBC 10*3/mm3 12.34*   HEMOGLOBIN g/dL 13.0   HEMATOCRIT % 41.0   PLATELETS 10*3/mm3 257     Results from last 7 days   Lab Units 08/21/20  0348   SODIUM mmol/L 135*   POTASSIUM mmol/L 3.8   CHLORIDE mmol/L 103   CO2 mmol/L 22.0   BUN mg/dL 13   CREATININE mg/dL 0.81   GLUCOSE mg/dL 160*   CALCIUM mg/dL 8.4*     Results from last 7 days   Lab Units 08/17/20  1012   INR  1.01         Assessment/Plan   POD #1 s/p Left carotid endarterectomy with patch angioplasty    >95% L ICA stenosis     Hypertension    Dyslipidemia on statin     Hypothyroidism on Rx     Carotid stenosis, left    Plan   D/C JERRY drain  D/C arterial line  Ambulate  D/C home if patient's  dizziness has resolved and is able to ambulate independently    08/21/20  10:10

## 2020-08-21 NOTE — OP NOTE
DATE OF PROCEDURE: 8/20/2020     PREOPERATIVE DIAGNOSES:  1. Left carotid artery stenosis     POSTOPERATIVE DIAGNOSES:    1. Left carotid artery stenosis     PROCEDURES PERFORMED:    1. Left carotid endarterectomy with patch angioplasty and EEG monitoring    SURGEON: Denver Silver MD       ASSISTANTS:    Jose Mckinney PA was responsible for performing the following activities: Retraction, Suction, Closing and Placing Dressing and their skilled assistance was necessary for the success of this case.    Circulator: Velma Wagner RN; Poornima Sanchez RN  Scrub Person: Hugo Choi  Nursing Assistant: Saranya Robles  Assistant: Jose Mckinney PA    ANESTHESIA: General endotracheal anesthesia with Nadia Grajeda CRNA     ESTIMATED BLOOD LOSS: Less than 50 mL.       Carotid clamp time: 18 minutes    INDICATIONS:  75 year old  female with a history of hypertension and hyperlipidemia  who presented with worsening asymptomatic left carotid artery stenosis.  The patient was felt to be a reasonable candidate for left carotid endarterectomy for her critical stenosis. The risks and benefits of surgery were discussed with the patient including pain, bleeding, infection, stroke, dysphagia, hoarseness, myocardial infarction and death. The patient understood these risks and wished to proceed with surgery.      DESCRIPTION OF PROCEDURE: The patient was taken to the operating room and placed under general endotracheal anesthesia. She was prepped and draped in the usual sterile fashion and a timeout was performed including the patient's name, procedure and antibiotic administration.  An incision was made along the anterior border of the left sternocleidomastoid.  Electrocautery was utilized to dissect down to the carotid sheath.  The common, external and internal carotid arteries were circumferentially dissected sharply and encircled with vessel loops.  The vagus and hypoglossal nerves  were identified and carefully avoided during surgery.  5000 units of heparin were administered systemically.  A clamp was then placed on the internal carotid artery with no EEG changes at 2 minutes.  Clamps were also applied to the common and external carotid arteries.  A longitudinal arteriotomy was performed from the common to internal carotid artery.  A hard near obstructive plaque was encountered that extended from the common to the internal and external carotid arteries.  This was freed from the vessel wall using a Penfield and sent for permanent pathology.  Smooth proximal and distal transitions were achieved.  A bovine pericardial patch was fashioned and secured using a running 6-0 Prolene suture.  Prior to tying down this suture, the internal carotid artery was flashed and clamp reapplied.  The common carotid artery was also flashed and clamp reapplied.  The prolene suture was then tied down and clamps to the external and common carotid artery were removed.  After several seconds, the clamp to the internal carotid artery was removed and hemostasis was achieved. A 15 Pashto drain was placed within the wound bed and secured using a 0 silk suture.  The sternocleidomastoid was reapproximated with a running 3-0 Vicryl suture.  The platysma was reapproximated with a running 3-0 Vicryl suture followed by a 4-0 Monocryl subcuticular stitch.  Overlying skin glue was applied to this incision and gauze and tape to the drain site.  The patient was extubated in the operating room and moving all 4 extremities prior to transport to the recovery room in stable condition.

## 2020-08-21 NOTE — PLAN OF CARE
Problem: Patient Care Overview  Goal: Plan of Care Review  Outcome: Ongoing (interventions implemented as appropriate)  Flowsheets  Taken 8/21/2020 0458  Progress: improving  Outcome Summary: Pt still drowsy at the beginning of the night, but was much more alert around midnight. Very pleasant, very accepting. C/o pain in her neck with movement, pt described it as sore and aching. The nurse attempted to admin IV pain meds but the IV was found to be dislodged, with the catheter completely withdrawn from the patients arm. The dose of pain meds was subsequently lost. Another dose was pulled from the omnicell and administered after another IV access was obtained. Little to no output from JERRY drain. Bladder scanned at around midnight, scan showed 502. Pt was able to void but only 350. At her request, she was allowed to try to urinated on her own via bedpan and was able to eliminate around 100 more. No c/o for abdominal discomfort and pelvic pressure or pain. Cardene restarted around 2100 to maintain SBP<140 per MD order. VSS. WCTM.  Taken 8/20/2020 2000  Plan of Care Reviewed With: patient

## 2020-08-22 VITALS
SYSTOLIC BLOOD PRESSURE: 129 MMHG | HEIGHT: 62 IN | OXYGEN SATURATION: 91 % | WEIGHT: 156.31 LBS | RESPIRATION RATE: 16 BRPM | TEMPERATURE: 98.1 F | DIASTOLIC BLOOD PRESSURE: 60 MMHG | BODY MASS INDEX: 28.76 KG/M2 | HEART RATE: 61 BPM

## 2020-08-22 PROCEDURE — 99024 POSTOP FOLLOW-UP VISIT: CPT | Performed by: THORACIC SURGERY (CARDIOTHORACIC VASCULAR SURGERY)

## 2020-08-22 RX ORDER — HYDROCODONE BITARTRATE AND ACETAMINOPHEN 7.5; 325 MG/1; MG/1
1 TABLET ORAL EVERY 6 HOURS PRN
Start: 2020-08-22 | End: 2020-08-30

## 2020-08-22 RX ORDER — HYDROCODONE BITARTRATE AND ACETAMINOPHEN 5; 325 MG/1; MG/1
1 TABLET ORAL EVERY 6 HOURS PRN
Qty: 40 TABLET | Refills: 0 | Status: SHIPPED | OUTPATIENT
Start: 2020-08-22 | End: 2020-08-22 | Stop reason: HOSPADM

## 2020-08-22 RX ADMIN — HYDRALAZINE HYDROCHLORIDE 50 MG: 50 TABLET, FILM COATED ORAL at 11:37

## 2020-08-22 RX ADMIN — CLOPIDOGREL BISULFATE 75 MG: 75 TABLET ORAL at 08:31

## 2020-08-22 RX ADMIN — ASPIRIN 81 MG: 81 TABLET, CHEWABLE ORAL at 08:31

## 2020-08-22 RX ADMIN — CETIRIZINE HYDROCHLORIDE 10 MG: 10 TABLET, FILM COATED ORAL at 08:30

## 2020-08-22 RX ADMIN — SODIUM CHLORIDE, PRESERVATIVE FREE 3 ML: 5 INJECTION INTRAVENOUS at 08:33

## 2020-08-22 RX ADMIN — LEVOTHYROXINE SODIUM 25 MCG: 25 TABLET ORAL at 06:04

## 2020-08-22 RX ADMIN — NICARDIPINE HYDROCHLORIDE 5 MG/HR: 0.1 INJECTION, SOLUTION INTRAVENOUS at 00:57

## 2020-08-22 RX ADMIN — NICARDIPINE HYDROCHLORIDE 5 MG/HR: 0.1 INJECTION, SOLUTION INTRAVENOUS at 10:21

## 2020-08-22 RX ADMIN — PANTOPRAZOLE SODIUM 40 MG: 40 TABLET, DELAYED RELEASE ORAL at 06:04

## 2020-08-22 RX ADMIN — METOPROLOL TARTRATE 50 MG: 50 TABLET, FILM COATED ORAL at 08:31

## 2020-08-22 RX ADMIN — FENOFIBRATE 145 MG: 145 TABLET ORAL at 08:31

## 2020-08-22 RX ADMIN — AMLODIPINE BESYLATE 10 MG: 10 TABLET ORAL at 08:31

## 2020-08-22 NOTE — PROGRESS NOTES
2 Days Post-Op       LOS: 2 days   Patient Care Team:  Eliel Barcenas MD as PCP - General  Eliel Barcenas MD as PCP - Family Medicine  Flash Vergara MD as Consulting Physician (Nephrology)    Chief complaint:    Subjective   Denies chest pain, denies shortness of breath    Objective    Vital Signs  Temp:  [97.7 °F (36.5 °C)-98.6 °F (37 °C)] 98 °F (36.7 °C)  Heart Rate:  [50-84] 60  Resp:  [16-20] 16  BP: (126-167)/(53-68) 137/61    Physical Exam:   General Appearance: alert, appears stated age and cooperative   Lungs: clear bilaterally   Heart: Regular rate and rhythm   Skin:  Incision c/d/i   Neurologically intact  Tongue is midline  Strengths are equal bilaterally  Results   Results from last 7 days   Lab Units 08/21/20  0348   WBC 10*3/mm3 12.34*   HEMOGLOBIN g/dL 13.0   HEMATOCRIT % 41.0   PLATELETS 10*3/mm3 257     Results from last 7 days   Lab Units 08/21/20  0348   SODIUM mmol/L 135*   POTASSIUM mmol/L 3.8   CHLORIDE mmol/L 103   CO2 mmol/L 22.0   BUN mg/dL 13   CREATININE mg/dL 0.81   GLUCOSE mg/dL 160*   CALCIUM mg/dL 8.4*               Assessment      >95% L ICA stenosis     Hypertension    Dyslipidemia on statin     Hypothyroidism on Rx     Carotid stenosis, left        Plan   Discharge home    Bertram Hannon PA-C  08/22/20  07:16

## 2020-08-24 LAB
CYTO UR: NORMAL
LAB AP CASE REPORT: NORMAL
LAB AP CLINICAL INFORMATION: NORMAL
PATH REPORT.FINAL DX SPEC: NORMAL
PATH REPORT.GROSS SPEC: NORMAL

## 2020-08-24 NOTE — DISCHARGE SUMMARY
5       CTS Discharge Summary    Patient Care Team:  Eliel Barcenas MD as PCP - General  Eliel Barcenas MD as PCP - Family Medicine  Flash Vergara MD as Consulting Physician (Nephrology)      Date of Admission: 8/20/2020  9:07 AM  Date of Discharge: 8/22/2020    Discharge Diagnosis  Past Medical History:   Diagnosis Date   • Anesthesia     low body temp several hours post hysterectomy, has had surgery since and no problem    • Arthritis    • Basal cell carcinoma    • Cataract    • Disease of thyroid gland    • Gallstones    • GERD (gastroesophageal reflux disease)    • History of kidney problems     elevated creatnine, resolved, f/u with dr vergara every 6 months, last tele visit 8-2020   • Hyperlipidemia    • Hypertension    • Spinal headache    • Stenosis of left carotid artery    • Wears glasses    • Wears partial dentures          >95% L ICA stenosis     Hypertension    Dyslipidemia on statin     Hypothyroidism on Rx     Carotid stenosis, left      History of Present Illness  Ms. Nancy Healy is a pleasant 75 y.o. female with a history of hypertension, hyperlipidemia and left carotid artery stenosis  who presents to office today for one-year follow-up for review and discussion of recent carotid duplex.  The patient was last seen on 7/10/2019 and denies any interval headache, dizziness, unilateral weakness, speech difficulty, visual changes or difficulty with ambulation.    Overall, the patient is doing well.      Hospital Course  The patient is a 75-year-old female who was admitted on 8/20/2020 and underwent a left carotid endarterectomy with patch arterioplasty performed by Dr. Denver Silver.  The patient tolerated the procedure well without any immediate complications and was transferred to the intensive care unit in stable condition.  On postoperative day #1, the patient was doing well having previously been extubated.  The patient was neurologically intact with no deficits and her tongue was  midline.  The patient was complaining of mild dizziness.  The patient had the JERRY drain and arterial line removed without difficulty.  The patient was transferred to telemetry.  On postoperative day #2, the patient's dizziness seemed to have resolved and she was ambulating without difficulty.  The patient had met criteria for discharge and was discharged home without difficulty.    Procedures Performed  Procedure(s):  CAROTID ENDARTERECTOMY WITH EEG LEFT       Consults:   Intensivist    Discharge Medications     Discharge Medications      New Medications      Instructions Start Date   clopidogrel 75 MG tablet  Commonly known as:  PLAVIX   75 mg, Oral, Daily      HYDROcodone-acetaminophen 7.5-325 MG per tablet  Commonly known as:  NORCO   1 tablet, Oral, Every 6 Hours PRN         Continue These Medications      Instructions Start Date   aspirin 81 MG chewable tablet   81 mg, Oral, Daily      B-12 1000 MCG capsule   500 mcg, Oral, Daily      fenofibrate 160 MG tablet   160 mg, Oral, Daily      Fosamax 70 MG tablet  Generic drug:  alendronate   70 mg, Oral, Every 7 Days, Sunday      furosemide 40 MG tablet  Commonly known as:  LASIX   40 mg, Oral, Daily      hydrALAZINE 50 MG tablet  Commonly known as:  APRESOLINE   50 mg, Oral, 2 times daily      levothyroxine 25 MCG tablet  Commonly known as:  SYNTHROID, LEVOTHROID   25 mcg, Oral, Daily      metoprolol tartrate 50 MG tablet  Commonly known as:  LOPRESSOR   50 mg, Oral, 2 Times Daily      OMEGA-3 KRILL OIL PO   1 capsule, Oral, Daily      OSTEO BI-FLEX ADV TRIPLE ST PO   1 tablet, Oral, Daily      pantoprazole 40 MG EC tablet  Commonly known as:  PROTONIX   40 mg, Oral, Daily      pravastatin 40 MG tablet  Commonly known as:  PRAVACHOL   40 mg, Oral, Daily      terconazole 0.4 % vaginal cream  Commonly known as:  TERAZOL 7   1 applicator, Vaginal, Daily PRN      TURMERIC CURCUMIN PO   1 capsule, Oral, 2 times daily         Stop These Medications    acetaminophen 500 MG  tablet  Commonly known as:  TYLENOL            Discharge Diet:   Diet Instructions     Diet: Consistent Carbohydrate, Cardiac      Discharge Diet:   Consistent Carbohydrate  Cardiac             Activity at Discharge:   Activity Instructions     Activity as Tolerated      Other Activity Instructions      Activity Instructions: No lifting greater than 10 pounds for 4 weeks no driving for 4 weeks or while taking narcotics.        Do not drive while taking narcotics    Follow-up Appointments  Follow-up with Dr. Silver in office in 2 to 3 weeks.     RORY Gu  08/24/20  14:31

## 2020-09-09 ENCOUNTER — OFFICE VISIT (OUTPATIENT)
Dept: CARDIAC SURGERY | Facility: CLINIC | Age: 75
End: 2020-09-09

## 2020-09-09 VITALS
BODY MASS INDEX: 26.31 KG/M2 | HEART RATE: 63 BPM | WEIGHT: 143 LBS | SYSTOLIC BLOOD PRESSURE: 185 MMHG | HEIGHT: 62 IN | TEMPERATURE: 97.1 F | OXYGEN SATURATION: 97 % | DIASTOLIC BLOOD PRESSURE: 82 MMHG

## 2020-09-09 DIAGNOSIS — I65.22 STENOSIS OF LEFT CAROTID ARTERY: Primary | ICD-10-CM

## 2020-09-09 DIAGNOSIS — I10 ESSENTIAL HYPERTENSION: ICD-10-CM

## 2020-09-09 PROCEDURE — 99024 POSTOP FOLLOW-UP VISIT: CPT | Performed by: NURSE PRACTITIONER

## 2020-09-09 RX ORDER — CLOPIDOGREL BISULFATE 75 MG/1
75 TABLET ORAL DAILY
Qty: 30 TABLET | Refills: 11 | Status: SHIPPED | OUTPATIENT
Start: 2020-09-09 | End: 2021-08-11 | Stop reason: SDUPTHER

## 2020-09-09 NOTE — PROGRESS NOTES
[facility] Cardiothoracic Surgery Follow-Up Note    Name:  Nancy Healy  MRN Number:  [id number]  Date of Encounter:  09/09/2020    Referred By:  No ref. provider found  PCP:  Eliel Barcenas MD    Chief Complaint:    Chief Complaint   Patient presents with   • Post-op     Hospital follow up s/p left cartotid endarterectomy 8/20/20. Complains of redness around incision site.   • Carotid Artery Disease       History of Present Illness:    Ms. Nancy Heayl is a pleasant 75 y.o. female with a history of hypertension and hyperlipidemia  who presented with worsening asymptomatic left carotid artery stenosis s/p left CEA 8/20/2020 who returns the office today for postoperative exam..  The patient contacted the office over concern for erythema at the base of her neck incision.  She denies fever, chills, visual disturbances, unilateral weakness, and difficulty speaking/swallowing.  The patient does note that she has had some hoarseness and sore throat since the surgery.  The patient has noted some yellow drainage from her incision site, which improved with application of hydrogen peroxide.  She feels that this erythema is starting to improve.      Review of Systems:  Review of Systems   Constitution: Negative for chills, fever and malaise/fatigue.   HENT: Positive for hoarse voice and sore throat.    Eyes: Negative for visual disturbance.   Cardiovascular: Negative for chest pain, dyspnea on exertion and leg swelling.   Respiratory: Negative for hemoptysis.    Skin: Negative for poor wound healing.   Gastrointestinal: Negative for abdominal pain.   Neurological: Negative for weakness.   Psychiatric/Behavioral: Negative for altered mental status.       Past Medical History:    Past Medical History:   Diagnosis Date   • Anesthesia     low body temp several hours post hysterectomy, has had surgery since and no problem    • Arthritis    • Basal cell carcinoma    • Cataract    • Disease of thyroid  gland    • Gallstones    • GERD (gastroesophageal reflux disease)    • History of kidney problems     elevated creatnine, resolved, f/u with dr salvador every 6 months, last tele visit 8-2020   • Hyperlipidemia    • Hypertension    • Spinal headache    • Stenosis of left carotid artery    • Wears glasses    • Wears partial dentures        Past Surgical History:    Past Surgical History:   Procedure Laterality Date   • ABDOMINAL SURGERY     • CARDIAC CATHETERIZATION     • CAROTID ENDARTERECTOMY Left 8/20/2020    Procedure: CAROTID ENDARTERECTOMY WITH EEG LEFT;  Surgeon: Denver Silver MD;  Location:  [facility];  Service: Vascular;  Laterality: Left;   • HYSTERECTOMY     • MD LAP,CHOLECYSTECTOMY N/A 9/15/2017    Procedure: CHOLECYSTECTOMY LAPAROSCOPIC;  Surgeon: Matthew Snyder MD;  Location:  [facility];  Service: General   • SKIN BIOPSY     • SKIN LESION EXCISION         Patient Active Problem List   Diagnosis   • Biliary dyskinesia   • >95% L ICA stenosis    • Hypertension   • Dyslipidemia on statin    • Hypothyroidism on Rx    • Carotid stenosis, left     Social History     Tobacco Use   • Smoking status: Never Smoker   • Smokeless tobacco: Never Used   Substance Use Topics   • Alcohol use: No   • Drug use: No     Family History   Problem Relation Age of Onset   • Heart disease Mother    • Diabetes Mother    • Heart disease Father    • Cancer Sister    • Heart disease Sister    • Cancer Brother    • Cancer Brother    • Heart disease Brother    • Cancer Brother    • Heart disease Brother    • Diabetes Brother    • Cancer Sister    • Heart disease Sister    • Diabetes Sister    • Heart disease Sister    • Cancer Sister    • Breast cancer Neg Hx        Medications:      Current Outpatient Medications:   •  alendronate (FOSAMAX) 70 MG tablet, Take 70 mg by mouth Every 7 (Seven) Days. Sunday, Disp: , Rfl:   •  aspirin 81 MG chewable tablet, Chew 81 mg Daily., Disp: , Rfl:   •  clopidogrel (PLAVIX) 75 MG tablet, Take 1  "tablet by mouth Daily., Disp: 30 tablet, Rfl: 2  •  Cyanocobalamin (B-12) 1000 MCG capsule, Take 500 mcg by mouth Daily., Disp: , Rfl:   •  fenofibrate 160 MG tablet, Take 160 mg by mouth Daily., Disp: , Rfl:   •  furosemide (LASIX) 40 MG tablet, Take 40 mg by mouth Daily., Disp: , Rfl:   •  hydrALAZINE (APRESOLINE) 50 MG tablet, Take 50 mg by mouth 2 (two) times a day., Disp: , Rfl:   •  levothyroxine (SYNTHROID, LEVOTHROID) 25 MCG tablet, Take 25 mcg by mouth Daily., Disp: , Rfl:   •  metoprolol tartrate (LOPRESSOR) 50 MG tablet, Take 50 mg by mouth 2 (Two) Times a Day., Disp: , Rfl:   •  Misc Natural Products (OSTEO BI-FLEX ADV TRIPLE ST PO), Take 1 tablet by mouth Daily., Disp: , Rfl:   •  OMEGA-3 KRILL OIL PO, Take 1 capsule by mouth Daily., Disp: , Rfl:   •  pantoprazole (PROTONIX) 40 MG EC tablet, Take 40 mg by mouth Daily., Disp: , Rfl:   •  pravastatin (PRAVACHOL) 40 MG tablet, Take 40 mg by mouth Daily., Disp: , Rfl:   •  terconazole (TERAZOL 7) 0.4 % vaginal cream, Insert 1 applicator into the vagina Daily As Needed for Irritation., Disp: , Rfl:   •  TURMERIC CURCUMIN PO, Take 1 capsule by mouth 2 (two) times a day., Disp: , Rfl:   No current facility-administered medications for this visit.     Facility-Administered Medications Ordered in Other Visits:   •  Chlorhexidine Gluconate Cloth 2 % pads 1 application, 1 application, Topical, Q12H PRN, Kalyani Waddell APRN    Allergies:  Allergies   Allergen Reactions   • Adhesive Tape Other (See Comments)     Skin blisters   • Penicillins Other (See Comments)     Severe yeast infection   • Contrast Dye Diarrhea       Physical Exam:  Vital Signs:    Vitals:    09/09/20 0942   BP: (!) 185/82   BP Location: Right arm   Patient Position: Sitting   Pulse: 63   Temp: 97.1 °F (36.2 °C)   SpO2: 97%   Weight: 64.9 kg (143 lb)   Height: 157.5 cm (62\")       Physical Exam  Gen- NAD, pleasant, cooperative  CV- Regular rate and rhythm, no murmur, gallop or rub  Pulm- Clear " to auscultation bilateral without wheeze or rhonchi  GI- Soft, normoactive bowel sounds, non-tender  Ext- Without edema  Incision- Well approximated left neck incision with periwound erythema noted at the inferior aspect of her left neck incision with no dehiscence or purulent drainage noted.  Neuro- CN II- XII grossly intact, tongue midline, voice normal.    Labs/Imaging:  No imaging was obtained in the office today.    Assessment / Plan:  Ms. Nancy Healy is a pleasant 75 y.o. female with a history of hypertension and hyperlipidemia  who presented with worsening asymptomatic left carotid artery stenosis s/p left CEA 8/20/2020 who returns the office today for postoperative exam..  The patient contacted the office over concern for erythema at the base of her neck incision.  She denies fever, chills, visual disturbances, unilateral weakness, and difficulty speaking/swallowing.  The patient does note that she has had some hoarseness and sore throat since the surgery.  The patient has noted some yellow drainage from her incision site, which improved with application of hydrogen peroxide.  She feels that this erythema is starting to improve.  Dr. Silver and I examined the patient's incision today and we do not feel that it warrants antibiotic therapy at this time.  There is a small amount of periwound erythema noted at the inferior end of the left neck incision with no drainage noted.  I have asked the patient to continue washing this with soap and water.  She may apply hydrogen peroxide as needed.  The patient does have some hoarseness and has had a sore throat since surgery.  She is neurologically intact and her tongue is midline, so I am more inclined to believe that she may have some irritation from intubation.  The patient's blood pressure was noted to be elevated in the office today.  She did bring with her blood pressure log which also demonstrates elevation of blood pressures.  I have asked her to make  an appointment with her primary care physician for follow-up in this regard.  We will plan to have the patient return to her regular scheduled appointment.    Please note, this document was produced using voice recognition software.    NELA Monsivais  [facility] Cardiothoracic Surgery

## 2020-10-14 ENCOUNTER — OFFICE VISIT (OUTPATIENT)
Dept: CARDIAC SURGERY | Facility: CLINIC | Age: 75
End: 2020-10-14

## 2020-10-14 VITALS
TEMPERATURE: 97.9 F | WEIGHT: 144.8 LBS | HEART RATE: 57 BPM | HEIGHT: 62 IN | OXYGEN SATURATION: 98 % | SYSTOLIC BLOOD PRESSURE: 153 MMHG | DIASTOLIC BLOOD PRESSURE: 74 MMHG | BODY MASS INDEX: 26.65 KG/M2

## 2020-10-14 DIAGNOSIS — I65.22 CAROTID STENOSIS, LEFT: Primary | ICD-10-CM

## 2020-10-14 PROCEDURE — 99024 POSTOP FOLLOW-UP VISIT: CPT | Performed by: NURSE PRACTITIONER

## 2021-03-10 ENCOUNTER — APPOINTMENT (OUTPATIENT)
Dept: CT IMAGING | Facility: HOSPITAL | Age: 76
End: 2021-03-10

## 2021-03-10 ENCOUNTER — APPOINTMENT (OUTPATIENT)
Dept: GENERAL RADIOLOGY | Facility: HOSPITAL | Age: 76
End: 2021-03-10

## 2021-03-10 ENCOUNTER — HOSPITAL ENCOUNTER (EMERGENCY)
Facility: HOSPITAL | Age: 76
Discharge: HOME | End: 2021-03-10
Attending: FAMILY MEDICINE | Admitting: FAMILY MEDICINE

## 2021-03-10 VITALS
DIASTOLIC BLOOD PRESSURE: 74 MMHG | OXYGEN SATURATION: 98 % | HEIGHT: 62 IN | RESPIRATION RATE: 18 BRPM | WEIGHT: 147 LBS | TEMPERATURE: 98.1 F | SYSTOLIC BLOOD PRESSURE: 171 MMHG | BODY MASS INDEX: 27.05 KG/M2 | HEART RATE: 68 BPM

## 2021-03-10 DIAGNOSIS — S01.01XA SCALP LACERATION, INITIAL ENCOUNTER: ICD-10-CM
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
## 2021-03-10 PROCEDURE — 99282 EMERGENCY DEPT VISIT SF MDM: CPT

## 2021-03-10 PROCEDURE — 70450 CT HEAD/BRAIN W/O DYE: CPT | Performed by: RADIOLOGY

## 2021-03-10 PROCEDURE — 72125 CT NECK SPINE W/O DYE: CPT | Performed by: RADIOLOGY

## 2021-03-10 PROCEDURE — 70450 CT HEAD/BRAIN W/O DYE: CPT

## 2021-03-10 PROCEDURE — 73562 X-RAY EXAM OF KNEE 3: CPT

## 2021-03-10 PROCEDURE — 73562 X-RAY EXAM OF KNEE 3: CPT | Performed by: RADIOLOGY

## 2021-03-10 PROCEDURE — 73090 X-RAY EXAM OF FOREARM: CPT

## 2021-03-10 PROCEDURE — 72125 CT NECK SPINE W/O DYE: CPT

## 2021-03-10 PROCEDURE — 73090 X-RAY EXAM OF FOREARM: CPT | Performed by: RADIOLOGY

## 2021-03-10 RX ORDER — LIDOCAINE HYDROCHLORIDE AND EPINEPHRINE BITARTRATE 20; .01 MG/ML; MG/ML
10 INJECTION, SOLUTION SUBCUTANEOUS ONCE
Status: DISCONTINUED | OUTPATIENT
Start: 2021-03-10 | End: 2021-03-10 | Stop reason: ALTCHOICE

## 2021-03-10 RX ORDER — LIDOCAINE HYDROCHLORIDE AND EPINEPHRINE 10; 10 MG/ML; UG/ML
10 INJECTION, SOLUTION INFILTRATION; PERINEURAL ONCE
Status: COMPLETED | OUTPATIENT
Start: 2021-03-10 | End: 2021-03-10

## 2021-03-10 RX ORDER — CLINDAMYCIN HYDROCHLORIDE 300 MG/1
300 CAPSULE ORAL 3 TIMES DAILY
Qty: 14 CAPSULE | Refills: 0 | Status: SHIPPED | OUTPATIENT
Start: 2021-03-10 | End: 2021-08-11

## 2021-03-10 RX ADMIN — LIDOCAINE HYDROCHLORIDE,EPINEPHRINE BITARTRATE 10 ML: 10; .01 INJECTION, SOLUTION INFILTRATION; PERINEURAL at 16:17

## 2021-03-10 NOTE — ED PROVIDER NOTES
Subjective   75-year-old female that presents to the emergency department with chief complaint fall just prior to arrival.  Patient states she was working outside when she fell hitting her head on rocks.  Patient states has had large amount of bleeding from left parietal region.  Patient denies any loss of consciousness.  Patient also complains of cervical neck pain.  Is where his arm and leg pain.      History provided by:  Patient   used: No    Head Injury  Location:  L parietal  Time since incident:  1 hour  Mechanism of injury: direct blow and fall    Fall:     Fall occurred:  Tripped and walking    Impact surface:  Athletic surface    Entrapped after fall: no    Pain details:     Quality:  Aching    Radiates to:  Face    Severity:  Moderate    Timing:  Intermittent    Progression:  Worsening  Chronicity:  New  Relieved by:  Nothing  Worsened by:  Nothing  Ineffective treatments:  None tried  Associated symptoms: no difficulty breathing, no disorientation, no focal weakness, no hearing loss, no loss of consciousness, no nausea, no neck pain, no numbness, no seizures, no tinnitus and no vomiting    Risk factors: not elderly        Review of Systems   Constitutional: Negative.  Negative for activity change, appetite change, chills, diaphoresis and fatigue.   HENT: Negative.  Negative for dental problem, drooling, ear discharge, hearing loss and tinnitus.    Eyes: Negative.  Negative for redness and itching.   Respiratory: Negative.  Negative for cough, chest tightness and shortness of breath.    Cardiovascular: Negative.  Negative for chest pain.   Gastrointestinal: Negative.  Negative for abdominal distention, blood in stool, nausea and vomiting.   Endocrine: Negative.  Negative for heat intolerance, polydipsia and polyphagia.   Genitourinary: Negative.  Negative for dyspareunia, dysuria, enuresis, flank pain, frequency and genital sores.   Musculoskeletal: Negative.  Negative for arthralgias,  gait problem, joint swelling and neck pain.   Skin: Positive for wound. Negative for color change and pallor.   Neurological: Negative.  Negative for dizziness, focal weakness, seizures, loss of consciousness, light-headedness and numbness.   Hematological: Negative.  Negative for adenopathy. Does not bruise/bleed easily.   Psychiatric/Behavioral: Negative.  Negative for agitation, behavioral problems, confusion, decreased concentration, dysphoric mood and hallucinations. The patient is not hyperactive.    All other systems reviewed and are negative.      Past Medical History:   Diagnosis Date   • Anesthesia     low body temp several hours post hysterectomy, has had surgery since and no problem    • Arthritis    • Basal cell carcinoma    • Cataract    • Disease of thyroid gland    • Gallstones    • GERD (gastroesophageal reflux disease)    • History of kidney problems     elevated creatnine, resolved, f/u with dr salvador every 6 months, last tele visit 8-2020   • Hyperlipidemia    • Hypertension    • Spinal headache    • Stenosis of left carotid artery    • Wears glasses    • Wears partial dentures        Allergies   Allergen Reactions   • Adhesive Tape Other (See Comments)     Skin blisters   • Penicillins Other (See Comments)     Severe yeast infection   • Contrast Dye Diarrhea       Past Surgical History:   Procedure Laterality Date   • ABDOMINAL SURGERY     • CARDIAC CATHETERIZATION     • CAROTID ENDARTERECTOMY Left 8/20/2020    Procedure: CAROTID ENDARTERECTOMY WITH EEG LEFT;  Surgeon: Denver Silver MD;  Location: [facility] OR;  Service: Vascular;  Laterality: Left;   • HYSTERECTOMY     • CO LAP,CHOLECYSTECTOMY N/A 9/15/2017    Procedure: CHOLECYSTECTOMY LAPAROSCOPIC;  Surgeon: Matthew Snyder MD;  Location: [facility] OR;  Service: General   • SKIN BIOPSY     • SKIN LESION EXCISION         Family History   Problem Relation Age of Onset   • Heart disease Mother    • Diabetes Mother    • Heart disease Father    •  Cancer Sister    • Heart disease Sister    • Cancer Brother    • Cancer Brother    • Heart disease Brother    • Cancer Brother    • Heart disease Brother    • Diabetes Brother    • Cancer Sister    • Heart disease Sister    • Diabetes Sister    • Heart disease Sister    • Cancer Sister    • Breast cancer Neg Hx        Social History     Socioeconomic History   • Marital status:      Spouse name: Not on file   • Number of children: 2   • Years of education: Not on file   • Highest education level: Not on file   Tobacco Use   • Smoking status: Never Smoker   • Smokeless tobacco: Never Used   Substance and Sexual Activity   • Alcohol use: No   • Drug use: No   • Sexual activity: Defer           Objective   Physical Exam  Vitals and nursing note reviewed.   Constitutional:       General: She is not in acute distress.     Appearance: Normal appearance. She is normal weight. She is not ill-appearing, toxic-appearing or diaphoretic.   HENT:      Head: Normocephalic and atraumatic.      Comments: Laceration to left parietal region.      Right Ear: Tympanic membrane, ear canal and external ear normal. There is no impacted cerumen.      Left Ear: Tympanic membrane, ear canal and external ear normal. There is no impacted cerumen.      Nose: Nose normal. No congestion or rhinorrhea.      Mouth/Throat:      Mouth: Mucous membranes are moist.      Pharynx: No oropharyngeal exudate or posterior oropharyngeal erythema.   Eyes:      General: No scleral icterus.        Right eye: No discharge.         Left eye: No discharge.      Extraocular Movements: Extraocular movements intact.      Conjunctiva/sclera: Conjunctivae normal.      Pupils: Pupils are equal, round, and reactive to light.   Cardiovascular:      Rate and Rhythm: Normal rate and regular rhythm.      Pulses: Normal pulses.      Heart sounds: Normal heart sounds. No murmur. No friction rub. No gallop.    Pulmonary:      Effort: Pulmonary effort is normal. No  respiratory distress.      Breath sounds: Normal breath sounds. No stridor. No wheezing, rhonchi or rales.   Chest:      Chest wall: No tenderness.   Abdominal:      General: Abdomen is flat. Bowel sounds are normal. There is no distension.      Palpations: There is no mass.      Tenderness: There is no abdominal tenderness. There is no right CVA tenderness, left CVA tenderness, guarding or rebound.      Hernia: No hernia is present.   Musculoskeletal:         General: No swelling, tenderness, deformity or signs of injury. Normal range of motion.      Cervical back: Normal range of motion. No rigidity or tenderness.      Right lower leg: No edema.      Left lower leg: No edema.   Lymphadenopathy:      Cervical: No cervical adenopathy.   Skin:     General: Skin is warm and dry.      Capillary Refill: Capillary refill takes less than 2 seconds.      Coloration: Skin is not jaundiced or pale.      Findings: No bruising, erythema, lesion or rash.   Neurological:      General: No focal deficit present.      Mental Status: She is alert and oriented to person, place, and time.      Cranial Nerves: No cranial nerve deficit.      Sensory: No sensory deficit.      Motor: No weakness.      Coordination: Coordination normal.      Gait: Gait normal.      Deep Tendon Reflexes: Reflexes normal.   Psychiatric:         Mood and Affect: Mood normal.         Behavior: Behavior normal.         Thought Content: Thought content normal.         Judgment: Judgment normal.         Laceration Repair    Date/Time: 3/10/2021 5:30 PM  Performed by: Jose Sanchez PA-C  Authorized by: Silvia Beasley DO     Consent:     Consent obtained:  Verbal    Consent given by:  Parent    Risks discussed:  Infection and pain    Alternatives discussed:  No treatment  Anesthesia (see MAR for exact dosages):     Anesthesia method:  Local infiltration    Local anesthetic:  Lidocaine 1% WITH epi  Laceration details:     Location:  Scalp    Scalp  location:  L temporal    Length (cm):  4    Depth (mm):  5  Repair type:     Repair type:  Simple  Exploration:     Wound exploration: wound explored through full range of motion      Contaminated: no    Treatment:     Area cleansed with:  Hibiclens    Amount of cleaning:  Standard    Irrigation solution:  Sterile saline    Irrigation volume:  1000    Irrigation method:  Pressure wash    Visualized foreign bodies/material removed: no    Skin repair:     Repair method:  Staples    Number of staples:  5  Approximation:     Approximation:  Close               ED Course  ED Course as of Mar 10 1807   Wed Mar 10, 2021   1624 IMPRESSION:    No acute findings in the right knee.    [BH]   1624 IMPRESSION:    No acute findings in the right forearm.    [BH]   1624 Patient had heavy amount of bleeding from wound to the left side of her scalp.  Patient is on Plavix.  Lidocaine with epi was administered with Surgicel and pressure dressing applied over the wound.  We will continue to monitor wound.    [BH]   1703 IMPRESSION:  1.  Stable appearance of cervical spine with degenerative changes most  pronounced at C5/6 with mild stenosis.  2.  No acute fracture or traumatic malalignment identified.    [BH]   1703 1.  No CT evidence of acute intracranial abnormality.  2.  Soft tissue scalp irregularities left frontal parietal scalp region.  Correlate visually for superficial injury versus external artifact.  3.  No calvarial fracture identified.    [BH]   1704 IMPRESSION:    No acute findings in the right knee    [BH]   1704 IMPRESSION:    No acute findings in the right forearm    []      ED Course User Index  [] Jose Sanchez PA-C                                           [facility]    Final diagnoses:   Fall, initial encounter   Scalp laceration, initial encounter            Jose Sanchez PA-C  03/10/21 1807

## 2021-03-11 NOTE — ED NOTES
Pt left the ED with verbalized understanding of staple removal as discussed with provider. Pt left the ED with bleeding controlled to laceration with verbalized understanding of wound care.      Clemencia Ivory, RN  03/10/21 0352

## 2021-08-11 ENCOUNTER — OFFICE VISIT (OUTPATIENT)
Dept: CARDIAC SURGERY | Facility: CLINIC | Age: 76
End: 2021-08-11

## 2021-08-11 VITALS
DIASTOLIC BLOOD PRESSURE: 78 MMHG | HEART RATE: 67 BPM | SYSTOLIC BLOOD PRESSURE: 138 MMHG | OXYGEN SATURATION: 98 % | HEIGHT: 62 IN | TEMPERATURE: 97.8 F | BODY MASS INDEX: 27.22 KG/M2 | WEIGHT: 147.9 LBS

## 2021-08-11 DIAGNOSIS — I65.22 CAROTID STENOSIS, LEFT: Primary | ICD-10-CM

## 2021-08-11 PROCEDURE — 99213 OFFICE O/P EST LOW 20 MIN: CPT | Performed by: NURSE PRACTITIONER

## 2021-08-11 RX ORDER — CLOPIDOGREL BISULFATE 75 MG/1
75 TABLET ORAL DAILY
Qty: 30 TABLET | Refills: 11 | Status: SHIPPED | OUTPATIENT
Start: 2021-08-11 | End: 2022-09-07 | Stop reason: SDUPTHER

## 2021-08-11 NOTE — PROGRESS NOTES
[facility] Cardiothoracic Surgery Office Follow Up Note     Date of Encounter: 08/11/2021     YOB: 1945  Name: Nancy Healy    PCP: Eliel Barcenas MD    Chief Complaint:    Chief Complaint   Patient presents with   • Follow-up     6 month follow yp for carotidstenosisi- no test noted. Pt states that she is feeling well, does have some numbness in the left side of her neck. No SOB - Bilateral lower leg swelling       History of Present Illness:    Nancy Healy is a 76 y.o. female with a history of hypertension, hyperlipidemia and left carotid stenosis status post left carotid endarterectomy with EEG on 8/20/2020 with Dr. Silver.  Pt presents today for 6 month follow up.  Last seen in the office on 10/14/20.  Since last office visit, she has been doing well.  Her hoarseness has almost completely resolved.  She still has some tenderness along her incision line.  She denies any TIA/CVA symptoms.  She is on DAPT.      Review of Systems:  Review of Systems   Constitutional: Positive for malaise/fatigue. Negative for chills, decreased appetite, diaphoresis, fever, night sweats and weight loss.   HENT: Positive for congestion (allergies). Negative for hoarse voice, sore throat and stridor.    Cardiovascular: Positive for dyspnea on exertion and palpitations. Negative for chest pain, claudication, irregular heartbeat, leg swelling, near-syncope, orthopnea, paroxysmal nocturnal dyspnea and syncope.   Respiratory: Negative for cough, hemoptysis, shortness of breath, sleep disturbances due to breathing, snoring, sputum production and wheezing.    Hematologic/Lymphatic: Negative for adenopathy and bleeding problem. Bruises/bleeds easily.   Skin: Negative for color change, dry skin, itching, poor wound healing and rash.   Musculoskeletal: Positive for arthritis, back pain and joint pain (bilateral shoudlers, hands and fingers). Negative for falls and muscle weakness.   Gastrointestinal:  Negative for abdominal pain, anorexia, constipation, diarrhea, hematochezia, melena, nausea and vomiting.   Neurological: Positive for numbness (left side of the neck ). Negative for difficulty with concentration, disturbances in coordination, dizziness, loss of balance, seizures, vertigo and weakness.   Psychiatric/Behavioral: Negative for altered mental status, depression, memory loss and substance abuse. The patient has insomnia. The patient is not nervous/anxious.    Allergic/Immunologic: Positive for environmental allergies. Negative for persistent infections.       Allergies:  Allergies   Allergen Reactions   • Adhesive Tape Other (See Comments)     Skin blisters   • Penicillins Other (See Comments)     Severe yeast infection   • Contrast Dye Diarrhea       Medications:      Current Outpatient Medications:   •  alendronate (FOSAMAX) 70 MG tablet, Take 70 mg by mouth Every 7 (Seven) Days. Sunday, Disp: , Rfl:   •  aspirin 81 MG chewable tablet, Chew 81 mg Daily., Disp: , Rfl:   •  clopidogrel (PLAVIX) 75 MG tablet, Take 1 tablet by mouth Daily., Disp: 30 tablet, Rfl: 11  •  Cyanocobalamin (B-12) 1000 MCG capsule, Take 500 mcg by mouth Daily., Disp: , Rfl:   •  fenofibrate 160 MG tablet, Take 160 mg by mouth Daily., Disp: , Rfl:   •  furosemide (LASIX) 40 MG tablet, Take 40 mg by mouth Daily., Disp: , Rfl:   •  hydrALAZINE (APRESOLINE) 50 MG tablet, Take 50 mg by mouth 2 (two) times a day., Disp: , Rfl:   •  levothyroxine (SYNTHROID, LEVOTHROID) 25 MCG tablet, Take 25 mcg by mouth Daily., Disp: , Rfl:   •  metoprolol tartrate (LOPRESSOR) 50 MG tablet, Take 50 mg by mouth 2 (Two) Times a Day., Disp: , Rfl:   •  Misc Natural Products (OSTEO BI-FLEX ADV TRIPLE ST PO), Take 1 tablet by mouth Daily., Disp: , Rfl:   •  pantoprazole (PROTONIX) 40 MG EC tablet, Take 40 mg by mouth Daily., Disp: , Rfl:   •  pravastatin (PRAVACHOL) 40 MG tablet, Take 40 mg by mouth Daily., Disp: , Rfl:   •  terconazole (TERAZOL 7) 0.4 %  vaginal cream, Insert 1 applicator into the vagina Daily As Needed for Irritation., Disp: , Rfl:   •  TURMERIC CURCUMIN PO, Take 1 capsule by mouth 2 (two) times a day., Disp: , Rfl:   •  OMEGA-3 KRILL OIL PO, Take 1 capsule by mouth Daily., Disp: , Rfl:   No current facility-administered medications for this visit.    Social History     Socioeconomic History   • Marital status:      Spouse name: Not on file   • Number of children: 2   • Years of education: Not on file   • Highest education level: Not on file   Tobacco Use   • Smoking status: Never Smoker   • Smokeless tobacco: Never Used   Vaping Use   • Vaping Use: Never used   Substance and Sexual Activity   • Alcohol use: No   • Drug use: No   • Sexual activity: Defer       Family History   Problem Relation Age of Onset   • Heart disease Mother    • Diabetes Mother    • Heart disease Father    • Cancer Sister    • Heart disease Sister    • Cancer Brother    • Cancer Brother    • Heart disease Brother    • Cancer Brother    • Heart disease Brother    • Diabetes Brother    • Cancer Sister    • Heart disease Sister    • Diabetes Sister    • Heart disease Sister    • Cancer Sister    • Breast cancer Neg Hx        Past Medical History:   Diagnosis Date   • Anesthesia     low body temp several hours post hysterectomy, has had surgery since and no problem    • Arthritis    • Basal cell carcinoma    • Cataract    • Disease of thyroid gland    • Gallstones    • GERD (gastroesophageal reflux disease)    • History of kidney problems     elevated creatnine, resolved, f/u with dr salvador every 6 months, last tele visit 8-2020   • Hyperlipidemia    • Hypertension    • Spinal headache    • Stenosis of left carotid artery    • Wears glasses    • Wears partial dentures        Past Surgical History:   Procedure Laterality Date   • ABDOMINAL SURGERY     • CARDIAC CATHETERIZATION     • CAROTID ENDARTERECTOMY Left 8/20/2020    Procedure: CAROTID ENDARTERECTOMY WITH EEG LEFT;   "Surgeon: Denver Silver MD;  Location: [facility] OR;  Service: Vascular;  Laterality: Left;   • HYSTERECTOMY     • IA LAP,CHOLECYSTECTOMY N/A 9/15/2017    Procedure: CHOLECYSTECTOMY LAPAROSCOPIC;  Surgeon: Matthew Snyder MD;  Location: [facility] OR;  Service: General   • SKIN BIOPSY     • SKIN LESION EXCISION         I have reviewed the following portions of the patient's history: allergies, current medications, past family history, past medical history, past social history, past surgical history and problem list and confirm it's accurate.    Physical Exam:  Vital Signs:    Vitals:    08/11/21 1011   BP: 138/78   Pulse: 67   Temp: 97.8 °F (36.6 °C)   SpO2: 98%   Weight: 67.1 kg (147 lb 14.4 oz)   Height: 157.5 cm (62\")     Body mass index is 27.05 kg/m².     Physical Exam  Vitals and nursing note reviewed.   Constitutional:       Appearance: Normal appearance. She is well-developed and well-groomed.   HENT:      Head: Normocephalic and atraumatic.   Neck:      Vascular: No carotid bruit.   Cardiovascular:      Rate and Rhythm: Normal rate and regular rhythm.      Heart sounds: Normal heart sounds, S1 normal and S2 normal. No murmur heard.   No friction rub.   Pulmonary:      Comments: Unlabored, Clear to auscultation bilaterally  Abdominal:      General: Bowel sounds are normal.      Palpations: Abdomen is soft.      Tenderness: There is no abdominal tenderness.   Musculoskeletal:      Cervical back: Neck supple.   Skin:     General: Skin is warm and dry.      Comments: Incisions:  Left neck incision Intact  No surrounding erythema, hematoma or induration   Neurological:      Mental Status: She is alert and oriented to person, place, and time.      Comments: Tongue midline  Smile symmetrical    Psychiatric:         Attention and Perception: Attention normal.         Mood and Affect: Mood normal.         Speech: Speech normal.         Behavior: Behavior is cooperative.         Labs/Imaging:    No radiology results " for the last 30 days.         Time Spent: I spent 22 minutes caring for Nancy on this date of service. This time includes time spent by me in the following activities: preparing for the visit, obtaining and/or reviewing a separately obtained history, performing a medically appropriate examination and/or evaluation, counseling and educating the patient/family/caregiver, ordering medications, tests, or procedures and documenting information in the medical record.     Assessment / Plan:  Diagnoses and all orders for this visit:    1. Carotid stenosis, left (Primary)     Nancy Healy is a 76 y.o. female with a history of hypertension, hyperlipidemia and left carotid stenosis status post left carotid endarterectomy with EEG on 8/20/2020 with Dr. Silver.  Pt is now stable from a post operative standpoint.  She denies any TIA/CVA symptoms.  She is on DAPT.  Will schedule pt for 1 year follow up carotid duplex this month with telephone visit.      Lisa Frost, APRCARLTON  [facility] Cardiothoracic Surgery

## 2021-08-12 DIAGNOSIS — R42 DIZZINESS: Primary | ICD-10-CM

## 2021-08-26 ENCOUNTER — HOSPITAL ENCOUNTER (OUTPATIENT)
Dept: CARDIOLOGY | Facility: HOSPITAL | Age: 76
Discharge: HOME | End: 2021-08-26
Admitting: NURSE PRACTITIONER

## 2021-08-26 DIAGNOSIS — R42 DIZZINESS: ICD-10-CM

## 2021-08-26 PROCEDURE — 93880 EXTRACRANIAL BILAT STUDY: CPT | Performed by: RADIOLOGY

## 2021-08-26 PROCEDURE — 93880 EXTRACRANIAL BILAT STUDY: CPT

## 2021-09-02 ENCOUNTER — OFFICE VISIT (OUTPATIENT)
Dept: CARDIAC SURGERY | Facility: CLINIC | Age: 76
End: 2021-09-02

## 2021-09-02 DIAGNOSIS — I65.22 CAROTID STENOSIS, LEFT: Primary | ICD-10-CM

## 2021-09-02 PROCEDURE — 99442 PR PHYS/QHP TELEPHONE EVALUATION 11-20 MIN: CPT | Performed by: NURSE PRACTITIONER

## 2021-09-02 NOTE — PROGRESS NOTES
[facility] Cardiothoracic Surgery Telephone visit    You have chosen to receive care through a telephone visit. Do you consent to use a telephone visit for your medical care today? Yes     Date of Encounter: 09/02/2021     MRN Number:  [id number]  Name:  Nancy Healy  Phone Number: [phone]     Referred By: No ref. provider found  PCP:  Eliel Barcenas MD    Chief Complaint:    Chief Complaint   Patient presents with   • Follow-up     1  year f/u w/ carotid duplex. Pt states that she is doing. Pt does have some lower leg swelling in both her legs, and her feet. pt states that this has been going on for years no other complaints       History of Present Illness:    Nancy Healy is a 76 y.o. female with a history of hypertension, hyperlipidemia and left carotid stenosis status post left carotid endarterectomy with EEG on 8/20/2020 with Dr. Silver.   Patient presents today for annual follow-up with her carotid duplex.  Last seen in the office on 8/11/2021 for 6-month follow-up due to ongoing hoarseness and tenderness postoperatively which had resolved.  An annual carotid duplex had not been scheduled and she is here to discuss results following testing.  She denies any TIA/CVA symptoms.  She is on DAPT.    Review of Systems:  Review of Systems   Constitutional: Negative for chills, decreased appetite, diaphoresis, fever, malaise/fatigue, night sweats and weight loss.   HENT: Positive for congestion. Negative for hoarse voice, sore throat and stridor. Ear discharge: allergies.    Cardiovascular: Positive for leg swelling. Negative for chest pain, claudication, dyspnea on exertion, irregular heartbeat, near-syncope, orthopnea, palpitations, paroxysmal nocturnal dyspnea and syncope.   Respiratory: Negative for cough, hemoptysis, shortness of breath, sleep disturbances due to breathing, snoring, sputum production and wheezing.    Hematologic/Lymphatic: Negative for adenopathy and bleeding  problem. Bruises/bleeds easily.   Skin: Negative for color change, dry skin, itching, poor wound healing and rash.   Musculoskeletal: Positive for arthritis and joint pain. Negative for back pain, falls and muscle weakness.   Gastrointestinal: Negative for abdominal pain, anorexia, constipation, diarrhea, hematochezia, melena, nausea and vomiting.   Neurological: Negative for difficulty with concentration, disturbances in coordination, dizziness, loss of balance, numbness, seizures, vertigo and weakness.   Psychiatric/Behavioral: Negative for altered mental status, depression, memory loss and substance abuse. The patient does not have insomnia and is not nervous/anxious.    Allergic/Immunologic: Positive for environmental allergies. Negative for persistent infections.       Past Medical History:    Past Medical History:   Diagnosis Date   • Anesthesia     low body temp several hours post hysterectomy, has had surgery since and no problem    • Arthritis    • Basal cell carcinoma    • Cataract    • Disease of thyroid gland    • Gallstones    • GERD (gastroesophageal reflux disease)    • History of kidney problems     elevated creatnine, resolved, f/u with dr salvador every 6 months, last tele visit 8-2020   • Hyperlipidemia    • Hypertension    • Spinal headache    • Stenosis of left carotid artery    • Wears glasses    • Wears partial dentures        Past Surgical History:    Past Surgical History:   Procedure Laterality Date   • ABDOMINAL SURGERY     • CARDIAC CATHETERIZATION     • CAROTID ENDARTERECTOMY Left 8/20/2020    Procedure: CAROTID ENDARTERECTOMY WITH EEG LEFT;  Surgeon: Denver Silver MD;  Location[facility] OR;  Service: Vascular;  Laterality: Left;   • HYSTERECTOMY     • MA LAP,CHOLECYSTECTOMY N/A 9/15/2017    Procedure: CHOLECYSTECTOMY LAPAROSCOPIC;  Surgeon: Matthew Snyder MD;  Location: [facility] OR;  Service: General   • SKIN BIOPSY     • SKIN LESION EXCISION         Allergies:  Allergies   Allergen  Reactions   • Adhesive Tape Other (See Comments)     Skin blisters   • Penicillins Other (See Comments)     Severe yeast infection   • Contrast Dye Diarrhea       Medications:      Current Outpatient Medications:   •  alendronate (FOSAMAX) 70 MG tablet, Take 70 mg by mouth Every 7 (Seven) Days. Sunday, Disp: , Rfl:   •  aspirin 81 MG chewable tablet, Chew 81 mg Daily., Disp: , Rfl:   •  clopidogrel (PLAVIX) 75 MG tablet, Take 1 tablet by mouth Daily., Disp: 30 tablet, Rfl: 11  •  Cyanocobalamin (B-12) 1000 MCG capsule, Take 500 mcg by mouth Daily., Disp: , Rfl:   •  fenofibrate 160 MG tablet, Take 160 mg by mouth Daily., Disp: , Rfl:   •  furosemide (LASIX) 40 MG tablet, Take 40 mg by mouth Daily., Disp: , Rfl:   •  hydrALAZINE (APRESOLINE) 50 MG tablet, Take 50 mg by mouth 2 (two) times a day., Disp: , Rfl:   •  levothyroxine (SYNTHROID, LEVOTHROID) 25 MCG tablet, Take 25 mcg by mouth Daily., Disp: , Rfl:   •  metoprolol tartrate (LOPRESSOR) 50 MG tablet, Take 50 mg by mouth 2 (Two) Times a Day., Disp: , Rfl:   •  Misc Natural Products (OSTEO BI-FLEX ADV TRIPLE ST PO), Take 1 tablet by mouth Daily., Disp: , Rfl:   •  OMEGA-3 KRILL OIL PO, Take 1 capsule by mouth Daily., Disp: , Rfl:   •  pantoprazole (PROTONIX) 40 MG EC tablet, Take 40 mg by mouth Daily., Disp: , Rfl:   •  pravastatin (PRAVACHOL) 40 MG tablet, Take 40 mg by mouth Daily., Disp: , Rfl:   •  terconazole (TERAZOL 7) 0.4 % vaginal cream, Insert 1 applicator into the vagina Daily As Needed for Irritation., Disp: , Rfl:   •  TURMERIC CURCUMIN PO, Take 1 capsule by mouth 2 (two) times a day., Disp: , Rfl:     Physical Exam:  Physical Exam  Pulmonary:      Effort: Pulmonary effort is normal.   Neurological:      Mental Status: She is alert.   Psychiatric:         Mood and Affect: Mood normal.         Labs/Imaging:  US Carotid Bilateral    Result Date: 8/26/2021  Mild atherosclerotic plaquing is noted involving the carotid bifurcation on the right not  significant changed from last years exam. The patient has undergone endarterectomy on the left. There is no evidence of restenosis on the left. Both vertebral arteries show antegrade flow.      Grading of ICA stenosis  <50% stenosis      PSV <125 cm/sec       PSVR <2.0  50-69% stenosis   -229 cm/sec  PSVR = 2.0-3.9  70-99% stenosis   PSV >230 cm/sec       PSVR >4  This report was finalized on 8/26/2021 2:51 PM by Dr. Everett Julian II, MD.         Assessment / Plan:  Diagnoses and all orders for this visit:    1. Carotid stenosis, left (Primary)     Nancy Healy is a 76 y.o. female with a history of hypertension, hyperlipidemia and left carotid stenosis status post left carotid endarterectomy with EEG on 8/20/2020 with Dr. Silver.   Discussed findings of stable bilateral carotid duplex with unchanged mild right ICA plaque (peak systolic 133 cm/s) and no restenosis on the left.  She denies any TIA/CVA symptoms.  She continues on DAPT.  We will plan to follow-up in 1 year with repeat carotid duplex.    This visit has been rescheduled as a phone visit to comply with patient safety concerns in accordance with CDC recommendations. Total time of discussion was 11 minutes.     NELA Sandra  [facility] Cardiothoracic Surgery  09/02/21  10:01 EDT

## 2022-02-01 ENCOUNTER — TRANSCRIBE ORDERS (OUTPATIENT)
Dept: ADMINISTRATIVE | Facility: HOSPITAL | Age: 77
End: 2022-02-01

## 2022-02-01 DIAGNOSIS — N18.2 CHRONIC KIDNEY DISEASE, STAGE II (MILD): Primary | ICD-10-CM

## 2022-04-21 NOTE — PROGRESS NOTES
"     [facility] Cardiothoracic Surgery Office Follow Up Note     Date of Encounter: 10/14/2020     MRN Number: [id number]  Name: Nancy Healy  Phone Number: [phone]     Referred By: No ref. provider found  PCP: Eliel Barcenas MD    Chief Complaint:    Chief Complaint   Patient presents with   • Post-op Follow-up     Hospital follow-up s/p left CEA 8/20/20 for carotid artery stenosis. Patient has loss of voice. She states she also has tightness in throat       History of Present Illness:    Nancy Healy is a 75 y.o. female with a history of hypertension, hyperlipidemia and left carotid stenosis status post left carotid endarterectomy with EEG on 8/20/2020 with Dr. Silver.  Patient was seen in early postoperative follow-up on 9/9/2020 with complaints of redness at the base of her incision.  She also noted some hoarseness and sore throat since surgery.  Erythema had improved with use of peroxide.  Encouraged continued use of soap and water with hydrogen peroxide use.  Concerning patient's hoarseness and sore throat since surgery, she was neurologically intact and it was believed to be more related to nerve irritation with surgery or possibly from intubation.  She was noted to have elevated blood pressures in the office as well.  Since last office visit, patient's incision has healed well.  She has some ongoing hoarseness as well as feelings of someone \" choking\" around her neck.  Denies any dysphagia.  She does have some occasional difficulty with swallowing large pills.  Blood pressures have improved at home with review of blood pressure log.    Review of Systems:  Review of Systems   Constitution: Negative for chills, decreased appetite, fever and malaise/fatigue.   HENT: Positive for hoarse voice and sore throat (has improved ).         Loss of voice- whispers     Cardiovascular: Positive for leg swelling (Baseline). Negative for chest pain, claudication, dyspnea on exertion, irregular " heartbeat, near-syncope, orthopnea, palpitations and syncope.   Respiratory: Negative for cough, hemoptysis, shortness of breath, sputum production and wheezing.    Hematologic/Lymphatic: Negative for bleeding problem. Does not bruise/bleed easily.   Skin: Negative for color change, poor wound healing and rash.   Musculoskeletal: Negative for back pain, falls and joint pain.   Gastrointestinal: Negative for abdominal pain, constipation, diarrhea, nausea and vomiting.   Neurological: Negative for focal weakness, numbness and paresthesias.   Psychiatric/Behavioral: Negative for depression. The patient does not have insomnia.        I have reviewed the review of systems as entered by my clinical support staff and have updated it as appropriate.       Allergies:  Allergies   Allergen Reactions   • Adhesive Tape Other (See Comments)     Skin blisters   • Penicillins Other (See Comments)     Severe yeast infection   • Contrast Dye Diarrhea       Medications:      Current Outpatient Medications:   •  alendronate (FOSAMAX) 70 MG tablet, Take 70 mg by mouth Every 7 (Seven) Days. Sunday, Disp: , Rfl:   •  aspirin 81 MG chewable tablet, Chew 81 mg Daily., Disp: , Rfl:   •  clopidogrel (PLAVIX) 75 MG tablet, Take 1 tablet by mouth Daily., Disp: 30 tablet, Rfl: 11  •  Cyanocobalamin (B-12) 1000 MCG capsule, Take 500 mcg by mouth Daily., Disp: , Rfl:   •  fenofibrate 160 MG tablet, Take 160 mg by mouth Daily., Disp: , Rfl:   •  furosemide (LASIX) 40 MG tablet, Take 40 mg by mouth Daily., Disp: , Rfl:   •  hydrALAZINE (APRESOLINE) 50 MG tablet, Take 50 mg by mouth 2 (two) times a day., Disp: , Rfl:   •  levothyroxine (SYNTHROID, LEVOTHROID) 25 MCG tablet, Take 25 mcg by mouth Daily., Disp: , Rfl:   •  metoprolol tartrate (LOPRESSOR) 50 MG tablet, Take 50 mg by mouth 2 (Two) Times a Day., Disp: , Rfl:   •  Misc Natural Products (OSTEO BI-FLEX ADV TRIPLE ST PO), Take 1 tablet by mouth Daily., Disp: , Rfl:   •  OMEGA-3 KRILL OIL PO,  "Take 1 capsule by mouth Daily., Disp: , Rfl:   •  pantoprazole (PROTONIX) 40 MG EC tablet, Take 40 mg by mouth Daily., Disp: , Rfl:   •  pravastatin (PRAVACHOL) 40 MG tablet, Take 40 mg by mouth Daily., Disp: , Rfl:   •  terconazole (TERAZOL 7) 0.4 % vaginal cream, Insert 1 applicator into the vagina Daily As Needed for Irritation., Disp: , Rfl:   •  TURMERIC CURCUMIN PO, Take 1 capsule by mouth 2 (two) times a day., Disp: , Rfl:   No current facility-administered medications for this visit.     Facility-Administered Medications Ordered in Other Visits:   •  Chlorhexidine Gluconate Cloth 2 % pads 1 application, 1 application, Topical, Q12H PRN, Kalyani Waddell APRN    Physical Exam:  Vital Signs:    Vitals:    10/14/20 1245   BP: 153/74   BP Location: Right arm   Patient Position: Sitting   Pulse: 57   Temp: 97.9 °F (36.6 °C)   SpO2: 98%   Weight: 65.7 kg (144 lb 12.8 oz)   Height: 157.5 cm (62\")     Body mass index is 26.48 kg/m².     Physical Exam  Vitals signs and nursing note reviewed.   Constitutional:       Appearance: Normal appearance. She is well-developed and well-groomed.   HENT:      Head: Normocephalic and atraumatic.      Comments: Hoarseness  Neck:      Musculoskeletal: Neck supple.   Cardiovascular:      Rate and Rhythm: Normal rate and regular rhythm.      Pulses:           Dorsalis pedis pulses are 2+ on the right side and 2+ on the left side.        Posterior tibial pulses are 2+ on the right side and 2+ on the left side.      Heart sounds: Normal heart sounds, S1 normal and S2 normal. No murmur. No friction rub.   Pulmonary:      Comments: Unlabored, Clear to auscultation bilaterally  Abdominal:      General: Bowel sounds are normal.      Palpations: Abdomen is soft.      Tenderness: There is no abdominal tenderness.   Musculoskeletal:      Right lower leg: Edema present.      Left lower leg: Edema present.   Skin:     General: Skin is warm and dry.      Comments: Incisions:  Left neck intact.  " Small amount of hematoma along medial side of incision.    No surrounding erythema, edema or induration   Neurological:      General: No focal deficit present.      Mental Status: She is alert and oriented to person, place, and time.      Gait: Gait is intact.      Comments: Tongue midline  Smile symmetrical  Eyebrow raise symmetrical   Psychiatric:         Attention and Perception: Attention normal.         Mood and Affect: Mood normal.         Speech: Speech normal.         Behavior: Behavior is cooperative.         Labs/Imaging:  This SmartLink has not been configured with any valid records.      No radiology results for the last 30 days.     Assessment / Plan:  Diagnoses and all orders for this visit:    1. Carotid stenosis, left (Primary)     Nancy Healy is a 75 y.o. female with a history of hypertension, hyperlipidemia and left carotid stenosis status post left carotid endarterectomy with EEG on 8/20/2020 with Dr. Silver.  Patient has been doing well from postoperative standpoint.  Blood pressures have improved since last office visit.  Patient does report that her hoarseness has improved since last office visit, reiterated that hoarseness may last up to a year secondary to nerve being irritated during surgery.  Encouraged patient to talk and sing to help with the healing process.  Since she does not currently have any swallowing issues we will continue to monitor.  Advised patient to call with any swelling or worsening symptoms and will get a CT scan at that time.  Otherwise we will follow-up with patient in 6 months.    Lisa Frost, [facility] Cardiothoracic Surgery    Please note that portions of this note may have been completed with a voice recognition program. Efforts were made to edit the dictations, but occasionally words are mistranscribed.    Additional Progress Note...

## 2022-05-31 ENCOUNTER — HOSPITAL ENCOUNTER (OUTPATIENT)
Dept: ULTRASOUND IMAGING | Facility: HOSPITAL | Age: 77
Discharge: HOME | End: 2022-05-31
Admitting: INTERNAL MEDICINE

## 2022-05-31 DIAGNOSIS — N18.2 CHRONIC KIDNEY DISEASE, STAGE II (MILD): ICD-10-CM

## 2022-05-31 PROCEDURE — 76775 US EXAM ABDO BACK WALL LIM: CPT | Performed by: RADIOLOGY

## 2022-05-31 PROCEDURE — 76775 US EXAM ABDO BACK WALL LIM: CPT

## 2022-07-25 DIAGNOSIS — R42 DIZZINESS: Primary | ICD-10-CM

## 2022-08-22 ENCOUNTER — HOSPITAL ENCOUNTER (OUTPATIENT)
Dept: CARDIOLOGY | Facility: HOSPITAL | Age: 77
Discharge: HOME | End: 2022-08-22
Admitting: NURSE PRACTITIONER

## 2022-08-22 DIAGNOSIS — R42 DIZZINESS: ICD-10-CM

## 2022-08-22 PROCEDURE — 93880 EXTRACRANIAL BILAT STUDY: CPT

## 2022-08-22 PROCEDURE — 93880 EXTRACRANIAL BILAT STUDY: CPT | Performed by: RADIOLOGY

## 2022-09-07 ENCOUNTER — OFFICE VISIT (OUTPATIENT)
Dept: CARDIAC SURGERY | Facility: CLINIC | Age: 77
End: 2022-09-07

## 2022-09-07 VITALS
SYSTOLIC BLOOD PRESSURE: 197 MMHG | HEIGHT: 62 IN | TEMPERATURE: 97.9 F | WEIGHT: 150 LBS | OXYGEN SATURATION: 98 % | HEART RATE: 67 BPM | BODY MASS INDEX: 27.6 KG/M2 | DIASTOLIC BLOOD PRESSURE: 67 MMHG

## 2022-09-07 DIAGNOSIS — I65.22 CAROTID STENOSIS, LEFT: Primary | ICD-10-CM

## 2022-09-07 PROBLEM — M85.80 OSTEOPENIA: Status: ACTIVE | Noted: 2022-09-07

## 2022-09-07 PROBLEM — C44.90 MALIGNANT NEOPLASM OF SKIN: Status: ACTIVE | Noted: 2022-09-07

## 2022-09-07 PROCEDURE — 99212 OFFICE O/P EST SF 10 MIN: CPT | Performed by: NURSE PRACTITIONER

## 2022-09-07 RX ORDER — VIT C/B6/B5/MAGNESIUM/HERB 173 50-5-6-5MG
CAPSULE ORAL 2 TIMES DAILY
COMMUNITY

## 2022-09-07 RX ORDER — CLOPIDOGREL BISULFATE 75 MG/1
75 TABLET ORAL DAILY
Qty: 90 TABLET | Refills: 4 | Status: SHIPPED | OUTPATIENT
Start: 2022-09-07

## 2022-09-07 NOTE — PROGRESS NOTES
[facility] Cardiothoracic Surgery Office Follow Up Note     Date of Encounter: 2022     Name: Nancy Healy  : 1945     Referred By: No ref. provider found  PCP: Eliel Barcenas MD    Chief Complaint:    Chief Complaint   Patient presents with   • Follow-up     1 year follow up for carotid stenosis.       Subjective      History of Present Illness:    Nancy Healy is a pleasant 77 y.o. female non-smoker with history of HTN, HLD on statin therapy, and carotid disease s/p left CEA in  by Dr. Silver.  She presents today for annual carotid surveillance. Pt denies hx of TIA or CVA since last visit, has had no new focal neurologic dysfunction, specifically vision changes or amaurosis fugax. She does complain of a consistent throat clearing.    Review of Systems:  Review of Systems   Constitutional: Negative. Negative for chills, decreased appetite, diaphoresis, fever, malaise/fatigue, night sweats, weight gain and weight loss.   HENT: Negative.  Negative for hoarse voice.    Eyes: Negative.  Negative for blurred vision, double vision and visual disturbance.   Cardiovascular: Positive for dyspnea on exertion and leg swelling. Negative for chest pain, claudication, irregular heartbeat, near-syncope, orthopnea, palpitations, paroxysmal nocturnal dyspnea and syncope.   Respiratory: Positive for cough. Negative for hemoptysis, shortness of breath, sputum production and wheezing.    Hematologic/Lymphatic: Negative for adenopathy and bleeding problem. Bruises/bleeds easily.   Skin: Negative.  Negative for color change, nail changes, poor wound healing and rash.   Musculoskeletal: Negative.  Negative for back pain, falls and muscle cramps.   Gastrointestinal: Positive for heartburn. Negative for abdominal pain and dysphagia.   Genitourinary: Negative.  Negative for flank pain.   Neurological: Negative.  Negative for brief paralysis, disturbances in coordination, dizziness, focal  weakness, headaches, light-headedness, loss of balance, numbness, paresthesias, sensory change, vertigo and weakness.   Psychiatric/Behavioral: Negative for depression and suicidal ideas. The patient has insomnia.    Allergic/Immunologic: Negative.  Negative for persistent infections.       I have reviewed the following portions of the patient's history: allergies, current medications, past family history, past medical history, past social history, past surgical history and problem list and confirm it's accurate.    Allergies:  Allergies   Allergen Reactions   • Adhesive Tape Other (See Comments)     Skin blisters   • Penicillins Other (See Comments)     Severe yeast infection   • Contrast Dye Diarrhea       Medications:      Current Outpatient Medications:   •  alendronate (FOSAMAX) 70 MG tablet, Take 70 mg by mouth Every 7 (Seven) Days. Sunday, Disp: , Rfl:   •  aspirin 81 MG chewable tablet, Chew 81 mg Daily., Disp: , Rfl:   •  clopidogrel (PLAVIX) 75 MG tablet, Take 1 tablet by mouth Daily., Disp: 90 tablet, Rfl: 4  •  Cyanocobalamin (B-12) 1000 MCG capsule, Take 500 mcg by mouth Daily., Disp: , Rfl:   •  fenofibrate 160 MG tablet, Take 160 mg by mouth Daily., Disp: , Rfl:   •  hydrALAZINE (APRESOLINE) 50 MG tablet, Take 50 mg by mouth 2 (two) times a day., Disp: , Rfl:   •  levothyroxine (SYNTHROID, LEVOTHROID) 25 MCG tablet, Take 25 mcg by mouth Daily., Disp: , Rfl:   •  metoprolol tartrate (LOPRESSOR) 50 MG tablet, Take 50 mg by mouth 2 (Two) Times a Day., Disp: , Rfl:   •  Misc Natural Products (OSTEO BI-FLEX ADV TRIPLE ST PO), Take 1 tablet by mouth Daily., Disp: , Rfl:   •  pantoprazole (PROTONIX) 40 MG EC tablet, Take 40 mg by mouth Daily., Disp: , Rfl:   •  pravastatin (PRAVACHOL) 40 MG tablet, Take 40 mg by mouth Daily., Disp: , Rfl:   •  Turmeric 500 MG capsule, Take  by mouth 2 (Two) Times a Day., Disp: , Rfl:   •  TURMERIC CURCUMIN PO, Take 1 capsule by mouth 2 (two) times a day., Disp: , Rfl:      History:   Past Medical History:   Diagnosis Date   • Anesthesia     low body temp several hours post hysterectomy, has had surgery since and no problem    • Arthritis    • Basal cell carcinoma    • Cataract    • Disease of thyroid gland    • Gallstones    • GERD (gastroesophageal reflux disease)    • History of kidney problems     elevated creatnine, resolved, f/u with dr salvador every 6 months, last tele visit 8-2020   • Hyperlipidemia    • Hypertension    • Spinal headache    • Stenosis of left carotid artery    • Wears glasses    • Wears partial dentures        Past Surgical History:   Procedure Laterality Date   • ABDOMINAL SURGERY     • CARDIAC CATHETERIZATION     • CAROTID ENDARTERECTOMY Left 8/20/2020    Procedure: CAROTID ENDARTERECTOMY WITH EEG LEFT;  Surgeon: Denver Silver MD;  Location:  [facility];  Service: Vascular;  Laterality: Left;   • HYSTERECTOMY     • CA LAP,CHOLECYSTECTOMY N/A 9/15/2017    Procedure: CHOLECYSTECTOMY LAPAROSCOPIC;  Surgeon: Matthew Snyder MD;  Location: [facility] OR;  Service: General   • SKIN BIOPSY     • SKIN LESION EXCISION         Social History     Socioeconomic History   • Marital status:    • Number of children: 2   Tobacco Use   • Smoking status: Never Smoker   • Smokeless tobacco: Never Used   Vaping Use   • Vaping Use: Never used   Substance and Sexual Activity   • Alcohol use: No   • Drug use: No   • Sexual activity: Defer        Family History   Problem Relation Age of Onset   • Heart disease Mother    • Diabetes Mother    • Heart disease Father    • Cancer Sister    • Heart disease Sister    • Cancer Brother    • Cancer Brother    • Heart disease Brother    • Cancer Brother    • Heart disease Brother    • Diabetes Brother    • Cancer Sister    • Heart disease Sister    • Diabetes Sister    • Heart disease Sister    • Cancer Sister    • Breast cancer Neg Hx        Objective     Physical Exam:  Vitals:    09/07/22 1021 09/07/22 1024   BP: (!) 200/68 (!)  "197/67   BP Location: Right arm Left arm   Patient Position: Sitting    Pulse: 67    Temp: 97.9 °F (36.6 °C)    SpO2: 98%    Weight: 68 kg (150 lb)    Height: 157.5 cm (62\")       Body mass index is 27.44 kg/m².    Physical Exam  Constitutional:       Appearance: Normal appearance.   Neck:      Vascular: No carotid bruit.   Cardiovascular:      Rate and Rhythm: Normal rate.   Pulmonary:      Effort: Pulmonary effort is normal.   Skin:     General: Skin is warm and dry.   Neurological:      Mental Status: She is alert and oriented to person, place, and time. Mental status is at baseline.         Imaging/Labs:  US Carotid Bilateral-Result Date: 8/22/2022  1. Left CEA changes. Mild plaque right ICA. No occlusion. 2. No hemodynamically significant stenosis of either RIGHT or LEFT ICA. 3. Antegrade flow noted both vertebral arteries.  This report was finalized on 8/22/2022 11:00 AM by Dr. Jeff Teague MD.         Assessment / Plan      Assessment / Plan:  Diagnoses and all orders for this visit:    1. Carotid stenosis, left s/p LEFT CEA 2020 (Primary)    Other orders  -     clopidogrel (PLAVIX) 75 MG tablet; Take 1 tablet by mouth Daily.  Dispense: 90 tablet; Refill: 4       · carotid disease s/p left CEA in 2020 by Dr. Silver.  · Annual surveillance with no interval TIA or CVA symptoms  · Compliant on DAPT  · No hemodynamically significant plaque on ultrasound  · Continue annual surveillance  · Recommend discussing persistent throat clearing with PCP    Patient Education:  The BE FAST acronym helps you remember the signs and symptoms of a stroke: Balance loss, Eyesight loss, Facial dropping, Arm weakness, Speech difficulty, and Time to call 911    Follow Up:   Return in about 1 year (around 9/7/2023) for Imaging next visit: carotid duplex.   Or sooner for any further concerns or worsening sign and symptoms. If unable to reach us in the office please dial 911 or go to the nearest emergency department.      Dary MANNING" Nathaniel RONDON  [facility] Cardiothoracic Surgery    Time Spent: I spent 18 minutes caring for Nancy on this date of service. This time includes time spent by me in the following activities: preparing for the visit, reviewing tests, obtaining and/or reviewing a separately obtained history, performing a medically appropriate examination and/or evaluation, counseling and educating the patient/family/caregiver, ordering medications, tests, or procedures, documenting information in the medical record, independently interpreting results and communicating that information with the patient/family/caregiver and care coordination.
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
## 2023-08-31 ENCOUNTER — HOSPITAL ENCOUNTER (OUTPATIENT)
Dept: CARDIOLOGY | Facility: HOSPITAL | Age: 78
Discharge: HOME | End: 2023-08-31
Payer: MEDICARE

## 2023-08-31 DIAGNOSIS — I65.22 CAROTID STENOSIS, LEFT: ICD-10-CM

## 2023-08-31 PROCEDURE — 93880 EXTRACRANIAL BILAT STUDY: CPT

## 2023-09-01 NOTE — PROGRESS NOTES
"     [facility] Follow Up Note     Date of Encounter: 2023     Name: Nancy Healy  : 1945     Referred By: No ref. provider found  PCP: Eliel Barcenas MD    Chief Complaint:    Chief Complaint   Patient presents with    Carotid Artery Disease     1 year follow-up with carotid duplex. Patient will need plavix refilled       Subjective      History of Present Illness:    Nancy Healy is a very pleasant 78 y.o. female non-smoker with history of HTN, HLD on statin therapy, CKD, venous insufficiency, and carotid disease s/p left CEA in  by Dr. Silver.  She presents today for annual carotid surveillance. Pt denies hx of TIA or CVA since last visit, has had no new focal neurologic dysfunction,  but she does endorse vision changes. She describes this as \"jagged\" lines across both visual fields that last ~ 15 mins. This is similar to symptoms she had prior to her left CEA. She has not experienced these in a long time but hat 3 in the past month. She saw an eye doctor who told her it was ocular migraines. Of note she arrives asymptomatic hypertensive, but is reporting averages SBP of 130s at home. She is followed by nephrology but not routinely followed by cardiology. She does suffer from chronic venous insufficiency and has apparently had \"blood flow\" work-up previously.     Review of Systems:  Review of Systems   Constitutional: Negative for chills, decreased appetite, diaphoresis, fever, malaise/fatigue, night sweats, weight gain and weight loss.   HENT:  Negative for hoarse voice.    Eyes:  Negative for blurred vision, double vision and visual disturbance.        Eyesight \"jagged\" at times- doesn't last long but had 3 episodes in the last month     Cardiovascular:  Positive for leg swelling. Negative for chest pain, claudication, dyspnea on exertion, irregular heartbeat, near-syncope, orthopnea, palpitations, paroxysmal nocturnal dyspnea and syncope. "   Respiratory:  Negative for cough, hemoptysis, shortness of breath, sputum production and wheezing.    Hematologic/Lymphatic: Negative for adenopathy and bleeding problem. Does not bruise/bleed easily.   Skin:  Negative for color change, nail changes, poor wound healing and rash.   Musculoskeletal:  Positive for joint pain. Negative for back pain, falls and muscle cramps.   Gastrointestinal:  Negative for abdominal pain, dysphagia and heartburn.   Genitourinary:  Negative for flank pain.   Neurological:  Positive for numbness (hands at times). Negative for brief paralysis, disturbances in coordination, dizziness, focal weakness, headaches, light-headedness, loss of balance, paresthesias, sensory change, vertigo and weakness.   Psychiatric/Behavioral:  Negative for depression and suicidal ideas. The patient is not nervous/anxious.    Allergic/Immunologic: Negative for persistent infections.     I have reviewed the following portions of the patient's history: problem list, current medications, allergies, past surgical history, past medical history, past social history, past family history, and ROS and confirm it's accurate.    Allergies:  Allergies   Allergen Reactions    Adhesive Tape Other (See Comments)     Skin blisters    Penicillins Other (See Comments)     Severe yeast infection    Contrast Dye (Echo Or Unknown Ct/Mr) Diarrhea       Medications:      Current Outpatient Medications:     alendronate (FOSAMAX) 70 MG tablet, Take 1 tablet by mouth Every 7 (Seven) Days. Sunday, Disp: , Rfl:     aspirin 81 MG chewable tablet, Chew 1 tablet Daily., Disp: , Rfl:     clopidogrel (PLAVIX) 75 MG tablet, Take 1 tablet by mouth Daily., Disp: 90 tablet, Rfl: 4    Cyanocobalamin (B-12) 1000 MCG capsule, Take 500 mcg by mouth Daily., Disp: , Rfl:     fenofibrate 160 MG tablet, Take 1 tablet by mouth Daily., Disp: , Rfl:     hydrALAZINE (APRESOLINE) 50 MG tablet, Take 1 tablet by mouth 2 (two) times a day., Disp: , Rfl:      levothyroxine (SYNTHROID, LEVOTHROID) 25 MCG tablet, Take 1 tablet by mouth Daily., Disp: , Rfl:     metoprolol tartrate (LOPRESSOR) 50 MG tablet, Take 1 tablet by mouth 2 (Two) Times a Day., Disp: , Rfl:     Misc Natural Products (OSTEO BI-FLEX ADV TRIPLE ST PO), Take 1 tablet by mouth Daily., Disp: , Rfl:     pantoprazole (PROTONIX) 40 MG EC tablet, Take 1 tablet by mouth Daily., Disp: , Rfl:     pravastatin (PRAVACHOL) 40 MG tablet, Take 1 tablet by mouth Daily., Disp: , Rfl:     Turmeric 500 MG capsule, Take  by mouth 2 (Two) Times a Day., Disp: , Rfl:     TURMERIC CURCUMIN PO, Take 1 capsule by mouth 2 (two) times a day., Disp: , Rfl:     History:   Past Medical History:   Diagnosis Date    Anesthesia     low body temp several hours post hysterectomy, has had surgery since and no problem     Arthritis     Basal cell carcinoma     Cataract     Disease of thyroid gland     Gallstones     GERD (gastroesophageal reflux disease)     History of kidney problems     elevated creatnine, resolved, f/u with dr salvador every 6 months, last tele visit 8-2020    Hyperlipidemia     Hypertension     Spinal headache     Stenosis of left carotid artery     Wears glasses     Wears partial dentures        Past Surgical History:   Procedure Laterality Date    ABDOMINAL SURGERY      CARDIAC CATHETERIZATION      CAROTID ENDARTERECTOMY Left 8/20/2020    Procedure: CAROTID ENDARTERECTOMY WITH EEG LEFT;  Surgeon: Denver Silver MD;  Location:  [facility];  Service: Vascular;  Laterality: Left;    HYSTERECTOMY      IN LAPAROSCOPY SURG CHOLECYSTECTOMY N/A 9/15/2017    Procedure: CHOLECYSTECTOMY LAPAROSCOPIC;  Surgeon: Matthew Snyder MD;  Location:  [facility];  Service: General    SKIN BIOPSY      SKIN LESION EXCISION         Social History     Socioeconomic History    Marital status:     Number of children: 2   Tobacco Use    Smoking status: Never    Smokeless tobacco: Never   Vaping Use    Vaping Use: Never used   Substance and  Sexual Activity    Alcohol use: No    Drug use: No    Sexual activity: Defer        Family History   Problem Relation Age of Onset    Heart disease Mother     Diabetes Mother     Heart disease Father     Cancer Sister     Heart disease Sister     Cancer Brother     Cancer Brother     Heart disease Brother     Cancer Brother     Heart disease Brother     Diabetes Brother     Cancer Sister     Heart disease Sister     Diabetes Sister     Heart disease Sister     Cancer Sister     Breast cancer Neg Hx        Objective     Physical Exam:  Vitals:    23 1020 23 1023   BP: (!) 183/80 (!) 190/89   BP Location: Left arm Right arm   Patient Position: Sitting Sitting   Pulse: 72    Temp: 97.1 °F (36.2 °C)    SpO2: 98%    Weight: 70.6 kg (155 lb 9.6 oz)       Body mass index is 28.46 kg/m².    Physical Exam  Constitutional:       Appearance: Normal appearance. She is overweight.   Neck:      Vascular: No carotid bruit.   Cardiovascular:      Rate and Rhythm: Normal rate.      Pulses:           Radial pulses are 2+ on the left side.        Dorsalis pedis pulses are 2+ on the right side and 2+ on the left side.      Heart sounds: S1 normal and S2 normal.   Pulmonary:      Effort: Pulmonary effort is normal.   Musculoskeletal:      Right lower le+      Left lower le+   Skin:     General: Skin is warm and dry.   Neurological:      Mental Status: She is alert and oriented to person, place, and time. Mental status is at baseline.       Imaging/Labs:  US Carotid Bilateral (2023 14:00)   No evidence of hemodynamically significant plaques or stenosis within the carotid system at this time.   This report was finalized on 2023 2:36 PM by Dr. Rehan Chavez MD.    US Carotid Bilateral (2022 10:20)   1. Left CEA changes. Mild plaque right ICA. No occlusion. 2. No hemodynamically significant stenosis of either RIGHT or LEFT ICA. 3. Antegrade flow noted both vertebral arteries.  This report was finalized  "on 8/22/2022 11:00 AM by Dr. Jeff Teague MD.    Assessment / Plan      Assessment / Plan:  Diagnoses and all orders for this visit:    1. Carotid stenosis, left s/p LEFT CEA 2020 (Primary)         s/p left CEA in 2020 by Dr. Silver.  Annual surveillance with no TIA or CVA symptoms  Is experiencing visual changes with \"\"jagged\" lines across both visual fields that last ~ 15 mins. similar to symptoms prior to her left CEA. opthalmology told her it was ocular migraines.  Duplex with no hemodynamically significant disease: PSV DELIA 124 cm/s.  PSV LICA 82 cm/s.  Antegrade flow bilaterally.  I have encouraged her to check BP during these episode and notify provider for abnl readings   Continue medical management and repeat duplex in 1 year  She has been instructed to contact our office if she continues to experience these symptoms     Patient Education:  The BE FAST acronym helps you remember the signs and symptoms of a stroke: Balance loss, Eyesight loss, Facial dropping, Arm weakness, Speech difficulty, and Time to call 911    Follow Up:   Return in about 1 year (around 9/6/2024) for Imaging: Carotid duplex.   Or sooner for any further concerns or worsening sign and symptoms. If unable to reach us in the office please dial 911 or go to the nearest emergency department.      NELA Webb  [facility]        Time Spent: I spent 25 minutes caring for Nancy on this date of service. This time includes time spent by me in the following activities: preparing for the visit, reviewing tests, obtaining and/or reviewing a separately obtained history, performing a medically appropriate examination and/or evaluation, counseling and educating the patient/family/caregiver, ordering medications, tests, or procedures, referring and communicating with other health care professionals, documenting information in the medical record, independently interpreting results and communicating that information " with the patient/family/caregiver, and care coordination.

## 2023-09-06 ENCOUNTER — OFFICE VISIT (OUTPATIENT)
Dept: CARDIAC SURGERY | Facility: CLINIC | Age: 78
End: 2023-09-06
Payer: MEDICARE

## 2023-09-06 VITALS
SYSTOLIC BLOOD PRESSURE: 190 MMHG | WEIGHT: 155.6 LBS | DIASTOLIC BLOOD PRESSURE: 89 MMHG | BODY MASS INDEX: 28.46 KG/M2 | OXYGEN SATURATION: 98 % | HEART RATE: 72 BPM | TEMPERATURE: 97.1 F

## 2023-09-06 DIAGNOSIS — I65.22 CAROTID STENOSIS, LEFT: Primary | ICD-10-CM

## 2023-09-06 PROBLEM — N18.9 CKD (CHRONIC KIDNEY DISEASE): Status: ACTIVE | Noted: 2023-09-06

## 2023-09-06 RX ORDER — CLOPIDOGREL BISULFATE 75 MG/1
75 TABLET ORAL DAILY
Qty: 90 TABLET | Refills: 3 | Status: SHIPPED | OUTPATIENT
Start: 2023-09-06

## 2024-03-28 ENCOUNTER — TELEPHONE (OUTPATIENT)
Dept: CARDIAC SURGERY | Facility: CLINIC | Age: 79
End: 2024-03-28
Payer: MEDICARE

## 2024-07-24 DIAGNOSIS — I65.22 CAROTID STENOSIS, LEFT: Primary | ICD-10-CM

## 2024-08-21 ENCOUNTER — HOSPITAL ENCOUNTER (OUTPATIENT)
Dept: ULTRASOUND IMAGING | Facility: HOSPITAL | Age: 79
Discharge: HOME | End: 2024-08-21
Admitting: NURSE PRACTITIONER
Payer: MEDICARE

## 2024-08-21 DIAGNOSIS — I65.22 CAROTID STENOSIS, LEFT: ICD-10-CM

## 2024-08-21 PROCEDURE — 93880 EXTRACRANIAL BILAT STUDY: CPT

## 2024-08-30 NOTE — PROGRESS NOTES
"     [facility] Follow Up Note     Date of Encounter: 2024     Name: Nancy Healy  : 1945     Referred By: No ref. provider found  PCP: Eliel Barcenas MD    Chief Complaint:    Chief Complaint   Patient presents with    Carotid Artery Disease     1 year follow up with carotid us        Subjective      History of Present Illness:    Nancy Healy is a very pleasant 79 y.o. female  non-smoker with history of HTN, HLD on statin therapy, CKD, venous insufficiency, and carotid disease s/p left CEA in  by Dr. Silver.  She presents today for annual carotid surveillance. Pt denies hx of TIA or CVA since last visit, has had no new focal neurologic dysfunction, specifically vision changes or amaurosis fugax. Of note when she was seen last year she was having vision changes described as \"jagged\" lines across both visual fields; her eye doctor who told her it was ocular migraines. She has had no recurrence. Although she arrives asymptomatically hypertensive in clinic today she tells me she averages 130s/60s at home.   She is followed by nephrology but not routinely followed by cardiology.     Review of Systems:  Review of Systems   Constitutional: Negative for chills, decreased appetite, diaphoresis, fever, malaise/fatigue, night sweats, weight gain and weight loss.   HENT:  Negative for hoarse voice.    Eyes:  Negative for blurred vision, double vision and visual disturbance.   Cardiovascular:  Negative for chest pain, claudication, dyspnea on exertion, irregular heartbeat, leg swelling, near-syncope, orthopnea, palpitations, paroxysmal nocturnal dyspnea and syncope.   Respiratory:  Negative for cough, hemoptysis, shortness of breath, sputum production and wheezing.    Hematologic/Lymphatic: Negative for adenopathy and bleeding problem. Does not bruise/bleed easily.   Skin:  Negative for color change, nail changes, poor wound healing and rash. "   Musculoskeletal:  Negative for back pain, falls and muscle cramps.   Gastrointestinal:  Negative for abdominal pain, dysphagia and heartburn.   Genitourinary:  Negative for flank pain.   Neurological:  Negative for brief paralysis, disturbances in coordination, dizziness, focal weakness, headaches, light-headedness, loss of balance, numbness, paresthesias, sensory change, vertigo and weakness.   Psychiatric/Behavioral:  Negative for depression and suicidal ideas.    Allergic/Immunologic: Negative for persistent infections.       I have reviewed the following portions of the patient's history: problem list, current medications, allergies, past surgical history, past medical history, past social history, past family history, and ROS and confirm it's accurate.    Allergies:  Allergies   Allergen Reactions    Adhesive Tape Other (See Comments)     Skin blisters    Penicillins Other (See Comments)     Severe yeast infection    Contrast Dye (Echo Or Unknown Ct/Mr) Diarrhea       Medications:      Current Outpatient Medications:     alendronate (FOSAMAX) 70 MG tablet, Take 1 tablet by mouth Every 7 (Seven) Days. Sunday, Disp: , Rfl:     aspirin 81 MG chewable tablet, Chew 1 tablet Daily., Disp: , Rfl:     clopidogrel (PLAVIX) 75 MG tablet, Take 1 tablet by mouth Daily., Disp: 90 tablet, Rfl: 3    Cyanocobalamin (B-12) 1000 MCG capsule, Take 500 mcg by mouth Daily., Disp: , Rfl:     fenofibrate 160 MG tablet, Take 1 tablet by mouth Daily., Disp: , Rfl:     hydrALAZINE (APRESOLINE) 50 MG tablet, Take 1 tablet by mouth 2 (two) times a day., Disp: , Rfl:     levothyroxine (SYNTHROID, LEVOTHROID) 25 MCG tablet, Take 1 tablet by mouth Daily., Disp: , Rfl:     metoprolol tartrate (LOPRESSOR) 50 MG tablet, Take 1 tablet by mouth 2 (Two) Times a Day., Disp: , Rfl:     Misc Natural Products (OSTEO BI-FLEX ADV TRIPLE ST PO), Take 1 tablet by mouth Daily., Disp: , Rfl:     pantoprazole (PROTONIX) 40 MG EC tablet, Take 1 tablet by  mouth Daily., Disp: , Rfl:     pravastatin (PRAVACHOL) 40 MG tablet, Take 1 tablet by mouth Daily., Disp: , Rfl:     Turmeric 500 MG capsule, Take  by mouth 2 (Two) Times a Day., Disp: , Rfl:     TURMERIC CURCUMIN PO, Take 1 capsule by mouth 2 (two) times a day., Disp: , Rfl:     History:   Past Medical History:   Diagnosis Date    Anesthesia     low body temp several hours post hysterectomy, has had surgery since and no problem     Arthritis     Basal cell carcinoma     Cataract     Disease of thyroid gland     Gallstones     GERD (gastroesophageal reflux disease)     History of kidney problems     elevated creatnine, resolved, f/u with dr salvador every 6 months, last tele visit 8-2020    Hyperlipidemia     Hypertension     Spinal headache     Stenosis of left carotid artery     Wears glasses     Wears partial dentures        Past Surgical History:   Procedure Laterality Date    ABDOMINAL SURGERY      CARDIAC CATHETERIZATION      CAROTID ENDARTERECTOMY Left 8/20/2020    Procedure: CAROTID ENDARTERECTOMY WITH EEG LEFT;  Surgeon: Denver Silver MD;  Location:  [facility];  Service: Vascular;  Laterality: Left;    HYSTERECTOMY      MS LAPAROSCOPY SURG CHOLECYSTECTOMY N/A 9/15/2017    Procedure: CHOLECYSTECTOMY LAPAROSCOPIC;  Surgeon: Matthew Snyder MD;  Location:  [facility];  Service: General    SKIN BIOPSY      SKIN LESION EXCISION         Social History     Socioeconomic History    Marital status:     Number of children: 2   Tobacco Use    Smoking status: Never    Smokeless tobacco: Never   Vaping Use    Vaping status: Never Used   Substance and Sexual Activity    Alcohol use: No    Drug use: No    Sexual activity: Defer        Family History   Problem Relation Age of Onset    Heart disease Mother     Diabetes Mother     Heart disease Father     Cancer Sister     Heart disease Sister     Cancer Brother     Cancer Brother     Heart disease Brother     Cancer Brother     Heart disease Brother     Diabetes  "Brother     Cancer Sister     Heart disease Sister     Diabetes Sister     Heart disease Sister     Cancer Sister     Breast cancer Neg Hx        Objective   Physical Exam:  Vitals:    09/04/24 0921 09/04/24 0922   BP: 170/90 168/90   BP Location: Left arm Right arm   Patient Position: Sitting Sitting   Pulse: 58    Temp: 97.1 °F (36.2 °C)    SpO2: 98%    Weight: 70.1 kg (154 lb 9.6 oz)    Height: 157.5 cm (62\")       Body mass index is 28.28 kg/m².    Physical Exam  Vitals and nursing note reviewed.   Constitutional:       Appearance: Normal appearance.   Neck:      Vascular: No carotid bruit.   Cardiovascular:      Rate and Rhythm: Normal rate.      Pulses:           Radial pulses are 2+ on the right side.      Heart sounds: S1 normal and S2 normal. No murmur heard.  Pulmonary:      Effort: Pulmonary effort is normal.   Skin:     General: Skin is warm and dry.   Neurological:      Mental Status: She is alert and oriented to person, place, and time. Mental status is at baseline.         Imaging/Labs:  US Carotid Bilateral (08/21/2024 13:43)   1. Moderate-advanced plaque right carotid system. No occlusion.  Endarterectomy changes left side.  2. No hemodynamically significant stenosis of either RIGHT or LEFT ICA.  3. Antegrade flow noted both vertebral arteries.   This report was finalized on 8/21/2024 1:50 PM by Dr. Jeff Teague MD.     Carotid Bilateral (08/31/2023 14:00)   No evidence of hemodynamically significant plaques or stenosis within the carotid system at this time.   This report was finalized on 8/31/2023 2:36 PM by Dr. Rehan Chavez MD.     US Carotid Bilateral (08/22/2022 10:20)   1. Left CEA changes. Mild plaque right ICA. No occlusion. 2. No hemodynamically significant stenosis of either RIGHT or LEFT ICA. 3. Antegrade flow noted both vertebral arteries.  This report was finalized on 8/22/2022 11:00 AM by Dr. Jeff Teague MD.      Assessment / Plan      Assessment / Plan:  Diagnoses and all " orders for this visit:    1. Carotid stenosis, left s/p LEFT CEA 2020 (Primary)    Other orders  -     clopidogrel (PLAVIX) 75 MG tablet; Take 1 tablet by mouth Daily.  Dispense: 90 tablet; Refill: 3       s/p left CEA in 2020 by Dr. Silver.  Annual surveillance with no interval TIA or CVA symptoms; no visual changes  2024 duplex with moderate to advance plaque to right carotid system but no hemodynamically significant stenosis  PSV DELIA 81cm/s PSV LICA 87cm/s Antegrade flow bilaterally  On exam no bruit.  Continue medical management with DAPT and statin therapy; no tobacco abuse  Given her noted possible advanced plaque on the right we discussed obtaining repeat duplex in 6 months which she is agreeable to    Patient Education:  The BE FAST acronym helps you remember the signs and symptoms of a stroke: Balance loss, Eyesight loss, Facial dropping, Arm weakness, Speech difficulty, and Time to call 911      Follow Up:   Return in about 6 months (around 3/4/2025) for Imaging: Carotid duplex.   Or sooner for any further concerns or worsening sign and symptoms. If unable to reach us in the office please dial 911 or go to the nearest emergency department.      NELA Webb  [facility] Cardiothoracic Surgery      Time Spent: I spent 19 minutes caring for Nancy on this date of service. This time includes time spent by me in the following activities: preparing for the visit, reviewing tests, obtaining and/or reviewing a separately obtained history, performing a medically appropriate examination and/or evaluation, counseling and educating the patient/family/caregiver, ordering medications, tests, or procedures, referring and communicating with other health care professionals, documenting information in the medical record, independently interpreting results and communicating that information with the patient/family/caregiver, and care coordination.

## 2024-09-04 ENCOUNTER — OFFICE VISIT (OUTPATIENT)
Dept: CARDIAC SURGERY | Facility: CLINIC | Age: 79
End: 2024-09-04
Payer: MEDICARE

## 2024-09-04 VITALS
HEIGHT: 62 IN | SYSTOLIC BLOOD PRESSURE: 168 MMHG | HEART RATE: 58 BPM | DIASTOLIC BLOOD PRESSURE: 90 MMHG | TEMPERATURE: 97.1 F | BODY MASS INDEX: 28.45 KG/M2 | WEIGHT: 154.6 LBS | OXYGEN SATURATION: 98 %

## 2024-09-04 DIAGNOSIS — I65.22 CAROTID STENOSIS, LEFT: Primary | ICD-10-CM

## 2024-09-04 PROCEDURE — 3080F DIAST BP >= 90 MM HG: CPT | Performed by: NURSE PRACTITIONER

## 2024-09-04 PROCEDURE — 99212 OFFICE O/P EST SF 10 MIN: CPT | Performed by: NURSE PRACTITIONER

## 2024-09-04 PROCEDURE — 3077F SYST BP >= 140 MM HG: CPT | Performed by: NURSE PRACTITIONER

## 2024-09-04 PROCEDURE — 1159F MED LIST DOCD IN RCRD: CPT | Performed by: NURSE PRACTITIONER

## 2024-09-04 PROCEDURE — 1160F RVW MEDS BY RX/DR IN RCRD: CPT | Performed by: NURSE PRACTITIONER

## 2024-09-04 RX ORDER — CLOPIDOGREL BISULFATE 75 MG/1
75 TABLET ORAL DAILY
Qty: 90 TABLET | Refills: 3 | Status: SHIPPED | OUTPATIENT
Start: 2024-09-04

## 2025-01-30 DIAGNOSIS — I65.22 CAROTID STENOSIS, LEFT: Primary | ICD-10-CM

## 2025-02-25 ENCOUNTER — HOSPITAL ENCOUNTER (OUTPATIENT)
Dept: ULTRASOUND IMAGING | Facility: HOSPITAL | Age: 80
Discharge: HOME | End: 2025-02-25
Admitting: NURSE PRACTITIONER
Payer: MEDICARE

## 2025-02-25 DIAGNOSIS — I65.22 CAROTID STENOSIS, LEFT: ICD-10-CM

## 2025-02-25 PROCEDURE — 93880 EXTRACRANIAL BILAT STUDY: CPT

## 2025-03-18 NOTE — PROGRESS NOTES
[facility] Cardiothoracic Surgery Office Follow Up Note     Date of Encounter: 2025     Name: Nancy Healy  : 1945     Referred By: No ref. provider found  PCP: Eliel Barcenas MD    Chief Complaint:    Chief Complaint   Patient presents with    Follow-up     6 month follow up with carotid duplex. Pt states that she is feeling great with no complaints.        Subjective      History of Present Illness:    Nancy Healy is a 79 y.o. female non-smoker with history of HTN, HLD on statin therapy, CKD, venous insufficiency, and carotid disease s/p left CEA in  by Dr. Silver.  Last seen in clinic 2024 with annual surveillance duplex which demonstrated moderate to advanced plaque right carotid system but no hemodynamically significant stenosis (R ICA PSV 81 cm/s, LICA PSV 87 cm/s, antegrade vertebral flow bilaterally).  A 6-month follow-up carotid duplex was recommended for which she presents back today.  Denies symptoms of TIA or CVA since last visit, has had no new focal neurologic dysfunction, specifically vision changes or amaurosis fugax.       Review of Systems:  Review of Systems   Constitutional: Negative for chills, fever, malaise/fatigue, night sweats and weight loss.   HENT:  Positive for congestion (sneezes often). Negative for hearing loss, nosebleeds and odynophagia.    Cardiovascular:  Negative for chest pain, claudication, dyspnea on exertion, leg swelling, orthopnea, palpitations and syncope.   Respiratory:  Negative for cough, hemoptysis, shortness of breath and wheezing.    Endocrine: Negative for cold intolerance, heat intolerance, polydipsia, polyphagia and polyuria.   Hematologic/Lymphatic: Positive for bleeding problem. Bruises/bleeds easily.   Skin:  Positive for poor wound healing (slower). Negative for itching and rash.   Musculoskeletal:  Positive for arthritis, back pain, joint pain and joint swelling (bilateral knees and shoulders and hands).  Negative for myalgias.   Gastrointestinal:  Negative for abdominal pain, constipation, hematemesis, melena, nausea and vomiting. Diarrhea: hx of.  Genitourinary:  Positive for frequency, hesitancy, nocturia and urgency. Negative for dysuria and hematuria.   Neurological:  Negative for dizziness, light-headedness, loss of balance and numbness.   Psychiatric/Behavioral:  Negative for depression and suicidal ideas. The patient does not have insomnia and is not nervous/anxious.    Allergic/Immunologic: Positive for environmental allergies (seasonal). Negative for HIV exposure.       I have reviewed the following portions of the patient's history: problem list, current medications, allergies, past surgical history, past medical history, past social history, past family history, and ROS and confirm it's accurate.    Allergies:  Allergies   Allergen Reactions    Adhesive Tape Other (See Comments)     Skin blisters    Penicillins Other (See Comments)     Severe yeast infection    Contrast Dye (Echo Or Unknown Ct/Mr) Diarrhea       Medications:      Current Outpatient Medications:     alendronate (FOSAMAX) 70 MG tablet, Take 1 tablet by mouth Every 7 (Seven) Days. Sunday, Disp: , Rfl:     aspirin 81 MG chewable tablet, Chew 1 tablet Daily., Disp: , Rfl:     clopidogrel (PLAVIX) 75 MG tablet, Take 1 tablet by mouth Daily., Disp: 90 tablet, Rfl: 3    Cyanocobalamin (B-12) 1000 MCG capsule, Take 500 mcg by mouth Daily., Disp: , Rfl:     fenofibrate 160 MG tablet, Take 1 tablet by mouth Daily., Disp: , Rfl:     hydrALAZINE (APRESOLINE) 50 MG tablet, Take 1 tablet by mouth 2 (two) times a day., Disp: , Rfl:     latanoprost (XALATAN) 0.005 % ophthalmic solution, Instill 1 drop TO BOTH eyes EVERY NIGHT AT BEDTIME, Disp: , Rfl:     levothyroxine (SYNTHROID, LEVOTHROID) 25 MCG tablet, Take 1 tablet by mouth Daily., Disp: , Rfl:     metoprolol tartrate (LOPRESSOR) 50 MG tablet, Take 1 tablet by mouth 2 (Two) Times a Day., Disp: , Rfl:      pantoprazole (PROTONIX) 40 MG EC tablet, Take 1 tablet by mouth Daily., Disp: , Rfl:     pravastatin (PRAVACHOL) 40 MG tablet, Take 1 tablet by mouth Daily., Disp: , Rfl:     Misc Natural Products (OSTEO BI-FLEX ADV TRIPLE ST PO), Take 1 tablet by mouth Daily. (Patient not taking: Reported on 3/19/2025), Disp: , Rfl:     Turmeric 500 MG capsule, Take  by mouth 2 (Two) Times a Day. (Patient not taking: Reported on 3/19/2025), Disp: , Rfl:     TURMERIC CURCUMIN PO, Take 1 capsule by mouth 2 (two) times a day. (Patient not taking: Reported on 3/19/2025), Disp: , Rfl:     History:   Past Medical History:   Diagnosis Date    Anesthesia     low body temp several hours post hysterectomy, has had surgery since and no problem     Arthritis     Basal cell carcinoma     Cataract     Disease of thyroid gland     Gallstones     GERD (gastroesophageal reflux disease)     History of kidney problems     elevated creatnine, resolved, f/u with dr salvador every 6 months, last tele visit 8-2020    Hyperlipidemia     Hypertension     Spinal headache     Stenosis of left carotid artery     Wears glasses     Wears partial dentures        Past Surgical History:   Procedure Laterality Date    ABDOMINAL SURGERY      CARDIAC CATHETERIZATION      CAROTID ENDARTERECTOMY Left 8/20/2020    Procedure: CAROTID ENDARTERECTOMY WITH EEG LEFT;  Surgeon: Denver Silver MD;  Location: [facility] OR;  Service: Vascular;  Laterality: Left;    HYSTERECTOMY      TX LAPAROSCOPY SURG CHOLECYSTECTOMY N/A 9/15/2017    Procedure: CHOLECYSTECTOMY LAPAROSCOPIC;  Surgeon: Matthew Snyder MD;  Location: [facility] OR;  Service: General    SKIN BIOPSY      SKIN LESION EXCISION         Social History     Socioeconomic History    Marital status:     Number of children: 2   Tobacco Use    Smoking status: Never    Smokeless tobacco: Never   Vaping Use    Vaping status: Never Used   Substance and Sexual Activity    Alcohol use: No    Drug use: No    Sexual activity:  "Defer        Family History   Problem Relation Age of Onset    Heart disease Mother     Diabetes Mother     Heart disease Father     Cancer Sister     Heart disease Sister     Cancer Brother     Cancer Brother     Heart disease Brother     Cancer Brother     Heart disease Brother     Diabetes Brother     Cancer Sister     Heart disease Sister     Diabetes Sister     Heart disease Sister     Cancer Sister     Breast cancer Neg Hx        Objective   Physical Exam:  Vitals:    03/19/25 1300 03/19/25 1302   BP: 144/86 156/88   BP Location: Left arm Right arm   Pulse: 97    Temp: 98.6 °F (37 °C)    SpO2: 97%    Weight: 68 kg (150 lb)    Height: 157.5 cm (62\")       Body mass index is 27.44 kg/m².    Physical Exam  Vitals reviewed.   Constitutional:       General: She is not in acute distress.     Appearance: She is not toxic-appearing.   HENT:      Head: Normocephalic and atraumatic.   Eyes:      General: Lids are normal.      Conjunctiva/sclera: Conjunctivae normal.      Pupils: Pupils are equal, round, and reactive to light.   Neck:      Vascular: No carotid bruit or JVD.      Comments: Well healed left CEA incision scar  Cardiovascular:      Rate and Rhythm: Normal rate and regular rhythm.      Pulses:           Carotid pulses are 2+ on the right side and 2+ on the left side.       Radial pulses are 2+ on the right side and 2+ on the left side.      Heart sounds: S1 normal and S2 normal. No murmur heard.  Pulmonary:      Effort: Pulmonary effort is normal. No respiratory distress.      Breath sounds: Normal breath sounds.   Musculoskeletal:         General: Normal range of motion.      Cervical back: Normal range of motion and neck supple.   Lymphadenopathy:      Cervical: No cervical adenopathy.   Skin:     General: Skin is warm and dry.      Capillary Refill: Capillary refill takes less than 2 seconds.   Neurological:      General: No focal deficit present.      Mental Status: She is alert and oriented to person, " place, and time.   Psychiatric:         Attention and Perception: Attention normal.         Mood and Affect: Mood normal.         Speech: Speech normal.         Behavior: Behavior is cooperative.       Imaging/Labs:  US Carotid Bilateral: 2/26/2025  RIGHT:  Mild-moderate plaque. No occlusion.  RIGHT CCA PSV: 191 cm/s  RIGHT ICA PSV: 124 cm/s  RIGHT ICA EDV: 26 cm/s  Right ICA/CCA Ratio: 0.6  Anterograde flow is demonstrated in RIGHT vertebral artery.   LEFT:  Endarterectomy changes. No occlusion.  LEFT CCA PSV: 121 cm/s  LEFT ICA PSV: 130 cm/s  LEFT EDV: 30 cm/s  Left ICA/CCA Ratio: 1.1  Anterograde flow is demonstrated in LEFT vertebral artery.   IMPRESSION:  1. Endarterectomy changes left side. Mild to moderate plaque right ICA.   2. No hemodynamically significant stenosis of either RIGHT or LEFT ICA.   3. Antegrade flow noted both vertebral arteries.     This report was finalized on 2/26/2025 6:52 AM by Dr. Jeff Teague MD.         US Carotid Bilateral (08/21/2024 13:43)   1. Moderate-advanced plaque right carotid system. No occlusion.  Endarterectomy changes left side.  2. No hemodynamically significant stenosis of either RIGHT or LEFT ICA.  3. Antegrade flow noted both vertebral arteries.   This report was finalized on 8/21/2024 1:50 PM by Dr. Jeff Teague MD.     US Carotid Bilateral (08/31/2023 14:00)   No evidence of hemodynamically significant plaques or stenosis within the carotid system at this time.   This report was finalized on 8/31/2023 2:36 PM by Dr. Rehan Chavez MD.     US Carotid Bilateral (08/22/2022 10:20)   1. Left CEA changes. Mild plaque right ICA. No occlusion. 2. No hemodynamically significant stenosis of either RIGHT or LEFT ICA. 3. Antegrade flow noted both vertebral arteries.  This report was finalized on 8/22/2022 11:00 AM by Dr. Jeff Teague MD.        Assessment / Plan      Assessment / Plan:  1. Carotid stenosis, left s/p LEFT CEA 2020  - 79 y.o. female non-smoker with history  of HTN, HLD on statin therapy, CKD, venous insufficiency, and carotid disease s/p left CEA in 2020 by Dr. Silver.   - Last seen in clinic 9/4/2024 with annual surveillance duplex: moderate to advanced plaque right carotid system but no hemodynamically significant stenosis (R ICA PSV 81 cm/s, LICA PSV 87 cm/s, antegrade vertebral flow bilaterally).    -  Returns w/ 6-month follow-up carotid duplex: R ICA  cm/s with ICA/CCA ratio 0.6.  Mild to moderate plaque.  No occlusion.  Left carotid demonstrates endarterectomy changes without occlusion and ICA  cm/s  -Remains asymptomatic in regards to TIA or CVA symptoms.  No new focal neurologic dysfunction, specifically vision changes or amaurosis fugax.   -Recommend continue medical therapy with aspirin, Plavix, and statin  -Will plan to continue surveillance carotid duplex imaging again in 1 year with clinic follow-up    Patient Education: Report any neurological symptoms promptly or call 911.  Be FAST: Balance issues, Eyesight loss, Face drooping, Arm weakness, Speech changes, Time to get help       Follow Up:   Return in about 1 year (around 3/19/2026) for Carotid duplex.   Or sooner for any further concerns or worsening sign and symptoms. If unable to reach us in the office please dial 911 or go to the nearest emergency department.      NELA Ibarra  [facility] Cardiothoracic Surgery    Time Spent: I spent 22 minutes caring for Nancy on this date of service. This time includes time spent by me in the following activities: preparing for the visit, reviewing tests, obtaining and/or reviewing a separately obtained history, performing a medically appropriate examination and/or evaluation, counseling and educating the patient/family/caregiver, documenting information in the medical record, and care coordination.

## 2025-03-19 ENCOUNTER — OFFICE VISIT (OUTPATIENT)
Dept: CARDIAC SURGERY | Facility: CLINIC | Age: 80
End: 2025-03-19
Payer: MEDICARE

## 2025-03-19 VITALS
BODY MASS INDEX: 27.6 KG/M2 | HEIGHT: 62 IN | TEMPERATURE: 98.6 F | WEIGHT: 150 LBS | HEART RATE: 97 BPM | SYSTOLIC BLOOD PRESSURE: 156 MMHG | DIASTOLIC BLOOD PRESSURE: 88 MMHG | OXYGEN SATURATION: 97 %

## 2025-03-19 DIAGNOSIS — I65.22 CAROTID STENOSIS, LEFT: Primary | ICD-10-CM

## 2025-03-19 PROCEDURE — 99213 OFFICE O/P EST LOW 20 MIN: CPT | Performed by: NURSE PRACTITIONER

## 2025-03-19 PROCEDURE — 3077F SYST BP >= 140 MM HG: CPT | Performed by: NURSE PRACTITIONER

## 2025-03-19 PROCEDURE — 1159F MED LIST DOCD IN RCRD: CPT | Performed by: NURSE PRACTITIONER

## 2025-03-19 PROCEDURE — 3079F DIAST BP 80-89 MM HG: CPT | Performed by: NURSE PRACTITIONER

## 2025-03-19 PROCEDURE — 1160F RVW MEDS BY RX/DR IN RCRD: CPT | Performed by: NURSE PRACTITIONER

## 2025-03-19 RX ORDER — LATANOPROST 50 UG/ML
SOLUTION/ DROPS OPHTHALMIC
COMMUNITY
Start: 2025-02-14

## 2025-06-10 ENCOUNTER — APPOINTMENT (OUTPATIENT)
Dept: GENERAL RADIOLOGY | Facility: HOSPITAL | Age: 80
End: 2025-06-10
Payer: MEDICARE

## 2025-06-10 ENCOUNTER — APPOINTMENT (OUTPATIENT)
Dept: CT IMAGING | Facility: HOSPITAL | Age: 80
End: 2025-06-10
Payer: MEDICARE

## 2025-06-10 ENCOUNTER — HOSPITAL ENCOUNTER (EMERGENCY)
Facility: HOSPITAL | Age: 80
Discharge: HOME | End: 2025-06-10
Payer: MEDICARE

## 2025-06-10 VITALS
TEMPERATURE: 98 F | HEIGHT: 62 IN | HEART RATE: 65 BPM | RESPIRATION RATE: 18 BRPM | DIASTOLIC BLOOD PRESSURE: 87 MMHG | SYSTOLIC BLOOD PRESSURE: 198 MMHG | WEIGHT: 150 LBS | OXYGEN SATURATION: 97 % | BODY MASS INDEX: 27.6 KG/M2

## 2025-06-10 DIAGNOSIS — I10 SEVERE HYPERTENSION: ICD-10-CM

## 2025-06-10 DIAGNOSIS — T14.8XXA HEMATOMA AND CONTUSION: ICD-10-CM

## 2025-06-10 DIAGNOSIS — S01.01XA LACERATION OF SCALP WITHOUT FOREIGN BODY, INITIAL ENCOUNTER: Primary | ICD-10-CM

## 2025-06-10 PROCEDURE — 70450 CT HEAD/BRAIN W/O DYE: CPT | Performed by: RADIOLOGY

## 2025-06-10 PROCEDURE — 73610 X-RAY EXAM OF ANKLE: CPT | Performed by: RADIOLOGY

## 2025-06-10 PROCEDURE — 73610 X-RAY EXAM OF ANKLE: CPT

## 2025-06-10 PROCEDURE — 73502 X-RAY EXAM HIP UNI 2-3 VIEWS: CPT

## 2025-06-10 PROCEDURE — 71250 CT THORAX DX C-: CPT | Performed by: RADIOLOGY

## 2025-06-10 PROCEDURE — 90471 IMMUNIZATION ADMIN: CPT

## 2025-06-10 PROCEDURE — 99284 EMERGENCY DEPT VISIT MOD MDM: CPT

## 2025-06-10 PROCEDURE — 25010000002 TETANUS-DIPHTH-ACELL PERTUSSIS 5-2.5-18.5 LF-MCG/0.5 SUSPENSION PREFILLED SYRINGE

## 2025-06-10 PROCEDURE — 73502 X-RAY EXAM HIP UNI 2-3 VIEWS: CPT | Performed by: RADIOLOGY

## 2025-06-10 PROCEDURE — 72131 CT LUMBAR SPINE W/O DYE: CPT

## 2025-06-10 PROCEDURE — 73562 X-RAY EXAM OF KNEE 3: CPT

## 2025-06-10 PROCEDURE — 70450 CT HEAD/BRAIN W/O DYE: CPT

## 2025-06-10 PROCEDURE — 90715 TDAP VACCINE 7 YRS/> IM: CPT

## 2025-06-10 PROCEDURE — 25010000002 LIDOCAINE 1% - EPINEPHRINE 1:100000 1 %-1:100000 SOLUTION

## 2025-06-10 PROCEDURE — 73562 X-RAY EXAM OF KNEE 3: CPT | Performed by: RADIOLOGY

## 2025-06-10 PROCEDURE — 73552 X-RAY EXAM OF FEMUR 2/>: CPT | Performed by: RADIOLOGY

## 2025-06-10 PROCEDURE — 71250 CT THORAX DX C-: CPT

## 2025-06-10 PROCEDURE — 73552 X-RAY EXAM OF FEMUR 2/>: CPT

## 2025-06-10 PROCEDURE — 72131 CT LUMBAR SPINE W/O DYE: CPT | Performed by: RADIOLOGY

## 2025-06-10 RX ORDER — AMLODIPINE BESYLATE 5 MG/1
5 TABLET ORAL ONCE
Status: COMPLETED | OUTPATIENT
Start: 2025-06-10 | End: 2025-06-10

## 2025-06-10 RX ORDER — LIDOCAINE HYDROCHLORIDE AND EPINEPHRINE 10; 10 MG/ML; UG/ML
10 INJECTION, SOLUTION INFILTRATION; PERINEURAL ONCE
Status: COMPLETED | OUTPATIENT
Start: 2025-06-10 | End: 2025-06-10

## 2025-06-10 RX ORDER — HYDROCODONE BITARTRATE AND ACETAMINOPHEN 5; 325 MG/1; MG/1
1 TABLET ORAL EVERY 8 HOURS PRN
Qty: 12 TABLET | Refills: 0 | Status: SHIPPED | OUTPATIENT
Start: 2025-06-10

## 2025-06-10 RX ORDER — HYDRALAZINE HYDROCHLORIDE 25 MG/1
50 TABLET, FILM COATED ORAL ONCE
Status: COMPLETED | OUTPATIENT
Start: 2025-06-10 | End: 2025-06-10

## 2025-06-10 RX ADMIN — LIDOCAINE HYDROCHLORIDE,EPINEPHRINE BITARTRATE 10 ML: 10; .01 INJECTION, SOLUTION INFILTRATION; PERINEURAL at 09:07

## 2025-06-10 RX ADMIN — HYDRALAZINE HYDROCHLORIDE 50 MG: 25 TABLET ORAL at 11:18

## 2025-06-10 RX ADMIN — TETANUS TOXOID, REDUCED DIPHTHERIA TOXOID AND ACELLULAR PERTUSSIS VACCINE, ADSORBED 0.5 ML: 5; 2.5; 8; 8; 2.5 SUSPENSION INTRAMUSCULAR at 09:39

## 2025-06-10 RX ADMIN — AMLODIPINE BESYLATE 5 MG: 5 TABLET ORAL at 09:38

## 2025-06-10 NOTE — ED NOTES
Called Dr. Vergara's office per patient request to notify them that she would miss her 0900 appointment. Office staff states that patient can call main office to get back on schedule later today.

## 2025-06-10 NOTE — ED PROVIDER NOTES
Subjective   History of Present Illness  Patient reports she tripped and fell this morning and hit her head and left hip/back.   Patient has laceration noted to forehead which is actively and steadily bleeding. Patient is on plavix and aspirin.   Patient reports no LOC and denies any neurological deficits/symptoms of the prior or subsequent to the fall.   Pt reports pain over her frontal head where she sustained her injury falling into the cabinets.  The patient also states that she has pain over her right knee and right ankle with some bruising.  The patient denies any cardiorespiratory symptoms either prior or subsequent to the fall.  .      Review of Systems   Constitutional:  Positive for activity change. Negative for chills, diaphoresis and fever.   HENT: Negative.     Eyes:  Negative for photophobia and visual disturbance.   Respiratory: Negative.     Cardiovascular: Negative.    Genitourinary: Negative.    Musculoskeletal: Negative.    Skin:  Positive for color change and wound. Negative for pallor.   Neurological: Negative.    Psychiatric/Behavioral: Negative.         Past Medical History:   Diagnosis Date    Anesthesia     low body temp several hours post hysterectomy, has had surgery since and no problem     Arthritis     Basal cell carcinoma     Cataract     Disease of thyroid gland     Gallstones     GERD (gastroesophageal reflux disease)     History of kidney problems     elevated creatnine, resolved, f/u with dr salvador every 6 months, last tele visit 8-2020    Hyperlipidemia     Hypertension     Spinal headache     Stenosis of left carotid artery     Wears glasses     Wears partial dentures        Allergies   Allergen Reactions    Adhesive Tape Other (See Comments)     Skin blisters    Penicillins Other (See Comments)     Severe yeast infection    Contrast Dye (Echo Or Unknown Ct/Mr) Diarrhea       Past Surgical History:   Procedure Laterality Date    ABDOMINAL SURGERY      CARDIAC CATHETERIZATION       CAROTID ENDARTERECTOMY Left 8/20/2020    Procedure: CAROTID ENDARTERECTOMY WITH EEG LEFT;  Surgeon: Denver Silver MD;  Location:  [facility] OR;  Service: Vascular;  Laterality: Left;    HYSTERECTOMY      RI LAPAROSCOPY SURG CHOLECYSTECTOMY N/A 9/15/2017    Procedure: CHOLECYSTECTOMY LAPAROSCOPIC;  Surgeon: Matthew Snyder MD;  Location:  [facility] OR;  Service: General    SKIN BIOPSY      SKIN LESION EXCISION         Family History   Problem Relation Age of Onset    Heart disease Mother     Diabetes Mother     Heart disease Father     Cancer Sister     Heart disease Sister     Cancer Brother     Cancer Brother     Heart disease Brother     Cancer Brother     Heart disease Brother     Diabetes Brother     Cancer Sister     Heart disease Sister     Diabetes Sister     Heart disease Sister     Cancer Sister     Breast cancer Neg Hx        Social History     Socioeconomic History    Marital status:     Number of children: 2   Tobacco Use    Smoking status: Never    Smokeless tobacco: Never   Vaping Use    Vaping status: Never Used   Substance and Sexual Activity    Alcohol use: No    Drug use: No    Sexual activity: Defer           Objective   Physical Exam  Vitals and nursing note reviewed.   Constitutional:       General: She is awake. She is not in acute distress.     Appearance: She is normal weight. She is ill-appearing. She is not toxic-appearing or diaphoretic.   HENT:      Head: Laceration present. No Irby's sign.      Jaw: There is normal jaw occlusion.        Comments: Left periorbital ecchymosis     Right Ear: Hearing and external ear normal.      Left Ear: Hearing and external ear normal.      Nose: Nose normal. No nasal deformity, signs of injury, laceration or nasal tenderness.      Right Nostril: No epistaxis.      Left Nostril: No epistaxis.      Right Sinus: No maxillary sinus tenderness or frontal sinus tenderness.      Left Sinus: No maxillary sinus tenderness or frontal sinus tenderness.       Mouth/Throat:      Lips: Pink. No lesions.      Mouth: Mucous membranes are moist. No lacerations.      Dentition: Normal dentition. No dental tenderness.   Eyes:      General:         Right eye: No discharge.         Left eye: No discharge.      Extraocular Movements: Extraocular movements intact.      Conjunctiva/sclera: Conjunctivae normal.      Pupils: Pupils are equal, round, and reactive to light.   Cardiovascular:      Rate and Rhythm: Normal rate and regular rhythm.      Heart sounds: Normal heart sounds. No murmur heard.     No friction rub. No gallop.   Pulmonary:      Effort: Pulmonary effort is normal. No respiratory distress.      Breath sounds: Normal breath sounds. No stridor.   Musculoskeletal:         General: Swelling, tenderness (Right ankle, knee, left hip, and lower back (paraspinal)) and signs of injury present. No deformity.   Skin:     Coloration: Skin is not jaundiced.      Findings: Bruising present. No erythema or rash.   Neurological:      General: No focal deficit present.      Mental Status: She is alert and oriented to person, place, and time. Mental status is at baseline.      Cranial Nerves: No cranial nerve deficit.      Sensory: No sensory deficit.      Motor: No weakness.   Psychiatric:         Behavior: Behavior normal. Behavior is cooperative.         Thought Content: Thought content normal.         Judgment: Judgment normal.         Procedures           ED Course  ED Course as of 06/10/25 1108   Tue Arnel 10, 2025   1053 CT Lumbar Spine Without Contrast  Vertebrae:  Degenerative facet arthropathy throughout the lumbar  spine, most prominent in the lower lumbar spine.  No acute fracture.    Discs/spinal canal/neural foramina:  Degenerative disc disease  throughout the lumbar spine.  No spinal canal stenosis.   [RA]   1053 CT Chest Without Contrast Diagnostic  No acute intrathoracic pathology [RA]   1053 CT Head Without Contrast  No acute intracranial pathology [RA]      ED Course  User Index  [RA] Tobi Green MD                                                       Medical Decision Making  Problems Addressed:  Hematoma and contusion: complicated acute illness or injury  Laceration of scalp without foreign body, initial encounter: complicated acute illness or injury    Amount and/or Complexity of Data Reviewed  Radiology: ordered. Decision-making details documented in ED Course.    Risk  Prescription drug management.        Final diagnoses:   Laceration of scalp without foreign body, initial encounter   Hematoma and contusion   Severe hypertension       ED Disposition  ED Disposition       ED Disposition   Discharge    Condition   Stable    Comment   --               Eliel Barcenas [place]  [address] [address]  [phone]    Schedule an appointment as soon as possible for a visit in 5 days  For wound re-check, For suture removal         Medication List      No changes were made to your prescriptions during this visit.            Tobi Green MD  06/10/25 1105       Tobi Green MD  06/10/25 1108     canal stenosis.   [RA]   1053 CT Chest Without Contrast Diagnostic  No acute intrathoracic pathology [RA]   1053 CT Head Without Contrast  No acute intracranial pathology [RA]      ED Course User Index  [RA] Tobi Green MD                                                       Medical Decision Making  Problems Addressed:  Hematoma and contusion: complicated acute illness or injury  Laceration of scalp without foreign body, initial encounter: complicated acute illness or injury  Severe hypertension: complicated acute illness or injury    Amount and/or Complexity of Data Reviewed  Radiology: ordered. Decision-making details documented in ED Course.    Risk  Prescription drug management.        Final diagnoses:   Laceration of scalp without foreign body, initial encounter   Hematoma and contusion   Severe hypertension       ED Disposition  ED Disposition       ED Disposition   Discharge    Condition   Stable    Comment   --               Eliel Barcenas MD  [address]  [place] [address]  [phone]    Schedule an appointment as soon as possible for a visit in 5 days  For wound re-check, For suture removal         Medication List        New Prescriptions      HYDROcodone-acetaminophen 5-325 MG per tablet  Commonly known as: NORCO  Take 1 tablet by mouth Every 8 (Eight) Hours As Needed for Moderate Pain or Severe Pain for up to 12 doses.               Where to Get Your Medications        These medications were sent to [facility] - [place], [place] - [address] [address] - [phone]  - [phone] FX  [address] [address], [place] [address]      Phone: [phone]   HYDROcodone-acetaminophen 5-325 MG per tablet            Tobi Green MD  06/10/25 1105       Tobi Green MD  06/10/25 1108       Tobi Green MD  06/20/25 9816

## 2025-06-10 NOTE — DISCHARGE INSTRUCTIONS
Patient has been given advice/instructions on laceration care as well as hematoma/periosteal hematoma.  The patient is aware that she should return to the emergency department should she have any signs of infection of her laceration.  The patient is aware that she should have the stitches removed in 5 days, as long as the wound is healing sufficiently.

## 2025-06-20 NOTE — PROGRESS NOTES
Enter Query Response Below      Query Response: Laceration repair documented in chart             If applicable, please update the problem list.

## (undated) DEVICE — BLD SCLPL BEAVR MINI STR 2BVL 180D LF

## (undated) DEVICE — SYR LUERLOK 10CC

## (undated) DEVICE — JACKSON-PRATT 100CC BULB RESERVOIR: Brand: CARDINAL HEALTH

## (undated) DEVICE — PK VASC 10

## (undated) DEVICE — 3M™ IOBAN™ 2 ANTIMICROBIAL INCISE DRAPE 6650EZ: Brand: IOBAN™ 2

## (undated) DEVICE — ENCORE® LATEX MICRO SIZE 7.5, STERILE LATEX POWDER-FREE SURGICAL GLOVE: Brand: ENCORE

## (undated) DEVICE — SUCTION CANISTER, 2500CC, RIGID: Brand: DEROYAL

## (undated) DEVICE — [HIGH FLOW INSUFFLATOR,  DO NOT USE IF PACKAGE IS DAMAGED,  KEEP DRY,  KEEP AWAY FROM SUNLIGHT,  PROTECT FROM HEAT AND RADIOACTIVE SOURCES.]: Brand: PNEUMOSURE

## (undated) DEVICE — INSUFFLATION NEEDLE TO ESTABLISH PNEUMOPERITONEUM.: Brand: INSUFFLATION NEEDLE

## (undated) DEVICE — ENDOCUT SCISSOR TIP, DISPOSABLE: Brand: RENEW

## (undated) DEVICE — GLV SURG SENSICARE MICRO PF LF 8 STRL

## (undated) DEVICE — Device

## (undated) DEVICE — COR LAP CHOLE: Brand: MEDLINE INDUSTRIES, INC.

## (undated) DEVICE — SUT SILK 3/0 TIES 18IN A184H

## (undated) DEVICE — SUT VIC 0/0 UR6 27IN DYED J603H

## (undated) DEVICE — CVR HNDL LIGHT RIGID

## (undated) DEVICE — ENDOPATH XCEL UNIVERSAL TROCAR STABLILITY SLEEVES: Brand: ENDOPATH XCEL

## (undated) DEVICE — SUT SILK 2/0 TIES 18IN A185H

## (undated) DEVICE — 1 ML TUBERCULIN SYRINGE REGULAR TIP: Brand: MONOJECT

## (undated) DEVICE — ENDOPATH XCEL BLADELESS TROCARS WITH STABILITY SLEEVES: Brand: ENDOPATH XCEL

## (undated) DEVICE — DRAIN JACKSON PRATT ROUND 15FR: Brand: CARDINAL HEALTH

## (undated) DEVICE — 3M™ STERI-DRAPE™ INSTRUMENT POUCH 1018: Brand: STERI-DRAPE™

## (undated) DEVICE — SUT MNCRYL 3/0 SH 27IN UD MCP416H

## (undated) DEVICE — GLV SURG SENSICARE PI MIC PF SZ7 LF STRL

## (undated) DEVICE — SUT PROLN 7/0 BV1 D/A 24IN 8702H

## (undated) DEVICE — HOLDER: Brand: DEROYAL

## (undated) DEVICE — SKIN AFFIX SURG ADHESIVE 72/CS 0.55ML: Brand: MEDLINE

## (undated) DEVICE — SUT VIC RAPD SZ 2/0 36IN CT1 VR945 BX/12

## (undated) DEVICE — SUT MNCRYL 4/0 PS2 18 IN

## (undated) DEVICE — SUT VICRYL 3/0 CT1 27IN J258H

## (undated) DEVICE — DECANT BG O JET

## (undated) DEVICE — SUT SILK 2/0 SH 30IN K833H

## (undated) DEVICE — NDL HYPO SFTY PROEDGE 27G 1 1/4IN GRY

## (undated) DEVICE — SPNG GZ WOVN 4X4IN 12PLY 10/BX STRL

## (undated) DEVICE — SUT PROLN 6/0 C1 D/A 30IN 8706H